# Patient Record
Sex: MALE | Race: WHITE | NOT HISPANIC OR LATINO | Employment: UNEMPLOYED | URBAN - METROPOLITAN AREA
[De-identification: names, ages, dates, MRNs, and addresses within clinical notes are randomized per-mention and may not be internally consistent; named-entity substitution may affect disease eponyms.]

---

## 2023-09-21 ENCOUNTER — HOSPITAL ENCOUNTER (EMERGENCY)
Facility: HOSPITAL | Age: 53
Discharge: PSYCHIATRIC OR SUBSTANCE USE FACILITY | End: 2023-09-21
Attending: EMERGENCY MEDICINE
Payer: MEDICAID

## 2023-09-21 ENCOUNTER — HOSPITAL ENCOUNTER (INPATIENT)
Facility: HOSPITAL | Age: 53
LOS: 5 days | Discharge: HOME | DRG: 885 | End: 2023-09-26
Attending: PSYCHIATRY & NEUROLOGY | Admitting: PSYCHIATRY & NEUROLOGY
Payer: MEDICAID

## 2023-09-21 VITALS
SYSTOLIC BLOOD PRESSURE: 153 MMHG | HEART RATE: 65 BPM | TEMPERATURE: 98 F | DIASTOLIC BLOOD PRESSURE: 100 MMHG | OXYGEN SATURATION: 98 % | RESPIRATION RATE: 18 BRPM

## 2023-09-21 DIAGNOSIS — F29 PSYCHOSIS, UNSPECIFIED PSYCHOSIS TYPE: Primary | ICD-10-CM

## 2023-09-21 DIAGNOSIS — F20.9 SCHIZOPHRENIA: ICD-10-CM

## 2023-09-21 DIAGNOSIS — R07.9 CHEST PAIN: ICD-10-CM

## 2023-09-21 LAB
ALBUMIN SERPL-MCNC: 5 G/DL (ref 3.5–5)
ALBUMIN/GLOB SERPL: 1.9 RATIO (ref 1.1–2)
ALP SERPL-CCNC: 78 UNIT/L (ref 40–150)
ALT SERPL-CCNC: 132 UNIT/L (ref 0–55)
AMPHET UR QL SCN: POSITIVE
APAP SERPL-MCNC: <17.4 UG/ML (ref 17.4–30)
APPEARANCE UR: CLEAR
AST SERPL-CCNC: 128 UNIT/L (ref 5–34)
BACTERIA #/AREA URNS AUTO: ABNORMAL /HPF
BARBITURATE SCN PRESENT UR: NEGATIVE
BASOPHILS # BLD AUTO: 0.03 X10(3)/MCL
BASOPHILS NFR BLD AUTO: 0.4 %
BENZODIAZ UR QL SCN: NEGATIVE
BILIRUB SERPL-MCNC: 1.7 MG/DL
BILIRUB UR QL STRIP.AUTO: NEGATIVE
BUN SERPL-MCNC: 29.6 MG/DL (ref 8.4–25.7)
CALCIUM SERPL-MCNC: 10.1 MG/DL (ref 8.4–10.2)
CANNABINOIDS UR QL SCN: POSITIVE
CHLORIDE SERPL-SCNC: 107 MMOL/L (ref 98–107)
CO2 SERPL-SCNC: 21 MMOL/L (ref 22–29)
COCAINE UR QL SCN: NEGATIVE
COLOR UR: ABNORMAL
CREAT SERPL-MCNC: 1.3 MG/DL (ref 0.73–1.18)
EOSINOPHIL # BLD AUTO: 0.07 X10(3)/MCL (ref 0–0.9)
EOSINOPHIL NFR BLD AUTO: 0.8 %
ERYTHROCYTE [DISTWIDTH] IN BLOOD BY AUTOMATED COUNT: 12.6 % (ref 11.5–17)
ETHANOL SERPL-MCNC: <10 MG/DL
FENTANYL UR QL SCN: NEGATIVE
FLUAV AG UPPER RESP QL IA.RAPID: NOT DETECTED
FLUBV AG UPPER RESP QL IA.RAPID: NOT DETECTED
GFR SERPLBLD CREATININE-BSD FMLA CKD-EPI: >60 MLS/MIN/1.73/M2
GLOBULIN SER-MCNC: 2.6 GM/DL (ref 2.4–3.5)
GLUCOSE SERPL-MCNC: 114 MG/DL (ref 74–100)
GLUCOSE UR QL STRIP.AUTO: NEGATIVE
HCT VFR BLD AUTO: 44.7 % (ref 42–52)
HGB BLD-MCNC: 16.1 G/DL (ref 14–18)
IMM GRANULOCYTES # BLD AUTO: 0.02 X10(3)/MCL (ref 0–0.04)
IMM GRANULOCYTES NFR BLD AUTO: 0.2 %
KETONES UR QL STRIP.AUTO: ABNORMAL
LEUKOCYTE ESTERASE UR QL STRIP.AUTO: NEGATIVE
LYMPHOCYTES # BLD AUTO: 1.5 X10(3)/MCL (ref 0.6–4.6)
LYMPHOCYTES NFR BLD AUTO: 17.6 %
MCH RBC QN AUTO: 30.7 PG (ref 27–31)
MCHC RBC AUTO-ENTMCNC: 36 G/DL (ref 33–36)
MCV RBC AUTO: 85.1 FL (ref 80–94)
MDMA UR QL SCN: NEGATIVE
MONOCYTES # BLD AUTO: 0.72 X10(3)/MCL (ref 0.1–1.3)
MONOCYTES NFR BLD AUTO: 8.5 %
NEUTROPHILS # BLD AUTO: 6.17 X10(3)/MCL (ref 2.1–9.2)
NEUTROPHILS NFR BLD AUTO: 72.5 %
NITRITE UR QL STRIP.AUTO: NEGATIVE
NRBC BLD AUTO-RTO: 0 %
OPIATES UR QL SCN: NEGATIVE
PCP UR QL: NEGATIVE
PH UR STRIP.AUTO: 5.5 [PH]
PH UR: 5.5 [PH] (ref 3–11)
PLATELET # BLD AUTO: 206 X10(3)/MCL (ref 130–400)
PMV BLD AUTO: 9.7 FL (ref 7.4–10.4)
POTASSIUM SERPL-SCNC: 3.7 MMOL/L (ref 3.5–5.1)
PROT SERPL-MCNC: 7.6 GM/DL (ref 6.4–8.3)
PROT UR QL STRIP.AUTO: ABNORMAL
RBC # BLD AUTO: 5.25 X10(6)/MCL (ref 4.7–6.1)
RBC #/AREA URNS AUTO: 14 /HPF
RBC UR QL AUTO: ABNORMAL
SALICYLATES SERPL-MCNC: <5 MG/DL (ref 15–30)
SARS-COV-2 RNA RESP QL NAA+PROBE: NOT DETECTED
SODIUM SERPL-SCNC: 140 MMOL/L (ref 136–145)
SP GR UR STRIP.AUTO: 1.03 (ref 1–1.03)
SQUAMOUS #/AREA URNS AUTO: <5 /HPF
TSH SERPL-ACNC: 0.99 UIU/ML (ref 0.35–4.94)
UROBILINOGEN UR STRIP-ACNC: 1
WBC # SPEC AUTO: 8.51 X10(3)/MCL (ref 4.5–11.5)
WBC #/AREA URNS AUTO: <5 /HPF

## 2023-09-21 PROCEDURE — 63600175 PHARM REV CODE 636 W HCPCS: Performed by: EMERGENCY MEDICINE

## 2023-09-21 PROCEDURE — 0240U COVID/FLU A&B PCR: CPT | Performed by: EMERGENCY MEDICINE

## 2023-09-21 PROCEDURE — 80307 DRUG TEST PRSMV CHEM ANLYZR: CPT | Performed by: PHYSICIAN ASSISTANT

## 2023-09-21 PROCEDURE — 85025 COMPLETE CBC W/AUTO DIFF WBC: CPT | Performed by: PHYSICIAN ASSISTANT

## 2023-09-21 PROCEDURE — 82077 ASSAY SPEC XCP UR&BREATH IA: CPT | Performed by: PHYSICIAN ASSISTANT

## 2023-09-21 PROCEDURE — 11400000 HC PSYCH PRIVATE ROOM

## 2023-09-21 PROCEDURE — 80143 DRUG ASSAY ACETAMINOPHEN: CPT | Performed by: PHYSICIAN ASSISTANT

## 2023-09-21 PROCEDURE — 81001 URINALYSIS AUTO W/SCOPE: CPT | Performed by: PHYSICIAN ASSISTANT

## 2023-09-21 PROCEDURE — 96372 THER/PROPH/DIAG INJ SC/IM: CPT | Performed by: EMERGENCY MEDICINE

## 2023-09-21 PROCEDURE — 80053 COMPREHEN METABOLIC PANEL: CPT | Performed by: PHYSICIAN ASSISTANT

## 2023-09-21 PROCEDURE — 99284 EMERGENCY DEPT VISIT MOD MDM: CPT | Mod: 25

## 2023-09-21 PROCEDURE — 80179 DRUG ASSAY SALICYLATE: CPT | Performed by: PHYSICIAN ASSISTANT

## 2023-09-21 PROCEDURE — 84443 ASSAY THYROID STIM HORMONE: CPT | Performed by: PHYSICIAN ASSISTANT

## 2023-09-21 RX ORDER — IBUPROFEN 400 MG/1
400 TABLET ORAL EVERY 6 HOURS PRN
Status: DISCONTINUED | OUTPATIENT
Start: 2023-09-21 | End: 2023-09-26 | Stop reason: HOSPADM

## 2023-09-21 RX ORDER — ACETAMINOPHEN 325 MG/1
650 TABLET ORAL EVERY 6 HOURS PRN
Status: DISCONTINUED | OUTPATIENT
Start: 2023-09-21 | End: 2023-09-26 | Stop reason: HOSPADM

## 2023-09-21 RX ORDER — HYDROXYZINE PAMOATE 25 MG/1
50 CAPSULE ORAL EVERY 6 HOURS PRN
Status: DISCONTINUED | OUTPATIENT
Start: 2023-09-21 | End: 2023-09-26 | Stop reason: HOSPADM

## 2023-09-21 RX ORDER — ZIPRASIDONE MESYLATE 20 MG/ML
20 INJECTION, POWDER, LYOPHILIZED, FOR SOLUTION INTRAMUSCULAR
Status: COMPLETED | OUTPATIENT
Start: 2023-09-21 | End: 2023-09-21

## 2023-09-21 RX ORDER — MAG HYDROX/ALUMINUM HYD/SIMETH 200-200-20
30 SUSPENSION, ORAL (FINAL DOSE FORM) ORAL EVERY 6 HOURS PRN
Status: DISCONTINUED | OUTPATIENT
Start: 2023-09-21 | End: 2023-09-26 | Stop reason: HOSPADM

## 2023-09-21 RX ORDER — KETOROLAC TROMETHAMINE 30 MG/ML
60 INJECTION, SOLUTION INTRAMUSCULAR; INTRAVENOUS
Status: COMPLETED | OUTPATIENT
Start: 2023-09-21 | End: 2023-09-21

## 2023-09-21 RX ADMIN — KETOROLAC TROMETHAMINE 60 MG: 30 INJECTION, SOLUTION INTRAMUSCULAR at 10:09

## 2023-09-21 RX ADMIN — ZIPRASIDONE MESYLATE 20 MG: 20 INJECTION, POWDER, LYOPHILIZED, FOR SOLUTION INTRAMUSCULAR at 10:09
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
## 2023-09-22 PROBLEM — F20.9 SCHIZOPHRENIA: Status: ACTIVE | Noted: 2023-09-22

## 2023-09-22 PROBLEM — Z72.0 TOBACCO ABUSE: Status: ACTIVE | Noted: 2023-09-22

## 2023-09-22 PROBLEM — F15.10 METHAMPHETAMINE ABUSE: Status: ACTIVE | Noted: 2023-09-22

## 2023-09-22 PROBLEM — F12.10 CANNABIS ABUSE: Status: ACTIVE | Noted: 2023-09-22

## 2023-09-22 LAB
CHOLEST SERPL-MCNC: 137 MG/DL
CHOLEST/HDLC SERPL: 4 {RATIO} (ref 0–5)
GLUCOSE P FAST SERPL-MCNC: 121 MG/DL (ref 70–155)
HDLC SERPL-MCNC: 33 MG/DL (ref 35–60)
LDLC SERPL CALC-MCNC: 81 MG/DL (ref 50–140)
T PALLIDUM AB SER QL: NONREACTIVE
TRIGL SERPL-MCNC: 115 MG/DL (ref 34–140)
VLDLC SERPL CALC-MCNC: 23 MG/DL

## 2023-09-22 PROCEDURE — 25000003 PHARM REV CODE 250: Performed by: PSYCHIATRY & NEUROLOGY

## 2023-09-22 PROCEDURE — 82947 ASSAY GLUCOSE BLOOD QUANT: CPT | Performed by: PSYCHIATRY & NEUROLOGY

## 2023-09-22 PROCEDURE — 86780 TREPONEMA PALLIDUM: CPT | Performed by: PSYCHIATRY & NEUROLOGY

## 2023-09-22 PROCEDURE — 80061 LIPID PANEL: CPT | Performed by: PSYCHIATRY & NEUROLOGY

## 2023-09-22 PROCEDURE — 11400000 HC PSYCH PRIVATE ROOM

## 2023-09-22 RX ORDER — AMLODIPINE BESYLATE 10 MG/1
10 TABLET ORAL DAILY
COMMUNITY
Start: 2023-08-19

## 2023-09-22 RX ORDER — RISPERIDONE 1 MG/1
1 TABLET ORAL 2 TIMES DAILY
Status: DISCONTINUED | OUTPATIENT
Start: 2023-09-22 | End: 2023-09-26 | Stop reason: HOSPADM

## 2023-09-22 RX ORDER — ATORVASTATIN CALCIUM 40 MG/1
40 TABLET, FILM COATED ORAL NIGHTLY
COMMUNITY
Start: 2023-08-30

## 2023-09-22 RX ORDER — PANTOPRAZOLE SODIUM 40 MG/1
40 TABLET, DELAYED RELEASE ORAL DAILY
Status: DISCONTINUED | OUTPATIENT
Start: 2023-09-22 | End: 2023-09-26 | Stop reason: HOSPADM

## 2023-09-22 RX ORDER — AMLODIPINE BESYLATE 5 MG/1
10 TABLET ORAL DAILY
Status: DISCONTINUED | OUTPATIENT
Start: 2023-09-22 | End: 2023-09-26 | Stop reason: HOSPADM

## 2023-09-22 RX ORDER — PANTOPRAZOLE SODIUM 40 MG/1
40 TABLET, DELAYED RELEASE ORAL DAILY
COMMUNITY

## 2023-09-22 RX ORDER — ATORVASTATIN CALCIUM 40 MG/1
40 TABLET, FILM COATED ORAL NIGHTLY
Status: DISCONTINUED | OUTPATIENT
Start: 2023-09-22 | End: 2023-09-26 | Stop reason: HOSPADM

## 2023-09-22 RX ORDER — DIVALPROEX SODIUM 250 MG/1
250 TABLET, DELAYED RELEASE ORAL 2 TIMES DAILY
Status: ON HOLD | COMMUNITY
Start: 2023-08-18 | End: 2023-09-22 | Stop reason: CLARIF

## 2023-09-22 RX ORDER — INDOMETHACIN 50 MG/1
50 CAPSULE ORAL 3 TIMES DAILY
Status: ON HOLD | COMMUNITY
Start: 2023-08-30 | End: 2023-09-22 | Stop reason: CLARIF

## 2023-09-22 RX ORDER — ESCITALOPRAM OXALATE 10 MG/1
10 TABLET ORAL DAILY
Status: ON HOLD | COMMUNITY
Start: 2023-08-19 | End: 2023-09-22 | Stop reason: CLARIF

## 2023-09-22 RX ADMIN — AMLODIPINE BESYLATE 10 MG: 5 TABLET ORAL at 04:09

## 2023-09-22 RX ADMIN — HYDROXYZINE PAMOATE 50 MG: 25 CAPSULE ORAL at 10:09

## 2023-09-22 RX ADMIN — RISPERIDONE 1 MG: 1 TABLET ORAL at 08:09

## 2023-09-22 RX ADMIN — RISPERIDONE 1 MG: 1 TABLET ORAL at 11:09

## 2023-09-22 RX ADMIN — ATORVASTATIN CALCIUM 40 MG: 40 TABLET, FILM COATED ORAL at 08:09

## 2023-09-22 RX ADMIN — HYDROXYZINE PAMOATE 50 MG: 25 CAPSULE ORAL at 03:09

## 2023-09-22 RX ADMIN — PANTOPRAZOLE SODIUM 40 MG: 40 TABLET, DELAYED RELEASE ORAL at 04:09

## 2023-09-22 RX ADMIN — HYDROXYZINE PAMOATE 50 MG: 25 CAPSULE ORAL at 08:09
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
## 2023-09-22 NOTE — PROGRESS NOTES
Recreation Therapy Progress Note    Date: 9 22 2023     Time: 10:00 am group     Group Title: creative expression     Mood: depressed and need to sleep he states.     Behavior: cooperative but requested to sleep . Pt excused to sleep . Pt is a new admit.     Affect: pt requested to sleep . Pt not feeling well.     Speech: normal    Cognition: alert when talk to.     Participation Level: pt requested to sleep     Intervention:cont rt services          Recreation Therapist   Signature: Verónica Gracia MA CTRS

## 2023-09-22 NOTE — HPI
54 yo male admitted to [facility] for bizarre behavior and suicidal ideation.  He also has h/o substance abuse.  Currently he is resting in the bed.  He denies any N/V/D/C.  No fever/chills.  No chest pain/SOB.  Hospitalist have been consulted for medical evaluation.

## 2023-09-22 NOTE — PLAN OF CARE
Problem: Behavior Regulation Impairment (Psychotic Signs/Symptoms)  Goal: Improved Behavioral Control (Psychotic Signs/Symptoms)  Outcome: Ongoing, Progressing     Problem: Cognitive Impairment (Psychotic Signs/Symptoms)  Goal: Optimal Cognitive Function (Psychotic Signs/Symptoms)  Outcome: Ongoing, Progressing     Problem: Psychomotor Impairment (Psychotic Signs/Symptoms)  Goal: Improved Psychomotor Symptoms (Psychotic Signs/Symptoms)  Outcome: Ongoing, Progressing     Problem: Behavior Regulation Impairment (Excessive Substance Use)  Goal: Improved Behavioral Control (Excessive Substance Use)  Outcome: Ongoing, Progressing     Problem: Behavior Regulation Impairment (Psychotic Signs/Symptoms)  Goal: Improved Behavioral Control (Psychotic Signs/Symptoms)  Outcome: Ongoing, Progressing     Problem: Cognitive Impairment (Psychotic Signs/Symptoms)  Goal: Optimal Cognitive Function (Psychotic Signs/Symptoms)  Outcome: Ongoing, Progressing     Problem: Psychomotor Impairment (Psychotic Signs/Symptoms)  Goal: Improved Psychomotor Symptoms (Psychotic Signs/Symptoms)  Outcome: Ongoing, Progressing     Problem: Behavior Regulation Impairment (Excessive Substance Use)  Goal: Improved Behavioral Control (Excessive Substance Use)  Outcome: Ongoing, Progressing

## 2023-09-22 NOTE — PROGRESS NOTES
Recreation Therapy Progress Note    Date: 9 22 2023     Time: 1400 hour group     Group Title: leisure skills     Mood: pt sleeping . Pt requested to sleep     Behavior: pt in bed    Affect: pt in bed     Speech: normal when talk to.     Cognition: pt in bed     Participation Level: 0% pt stated he needs sleep    Intervention:cont to offer rt services.         Recreation Therapist   Signature: Verónica soriano MA CTRS

## 2023-09-22 NOTE — NURSING
Admission Note:    Glenn Ruiz is a 53 y.o. male, : 1970, MRN: [id number], admitted on 2023 to [facility] ([facility]) for Eloy Pulido MD with a diagnosis of Schizophrenia. Patient admitted on a status of  Emergency Certificate (CEC). Glenn reports no known food or drug allergies.    Patient demonstrated an affect that was flat and anxious. Patient demonstrated mood during assessment that was depressed. Patient had an appearance that was disheveled.  Patient denies suicidal ideation. Patient denies suicide plan. Patient denies hallucinations.    Glenn's  height is 6' (1.829 m) and weight is 100.3 kg (221 lb 1.9 oz). His temperature is 97.7 °F (36.5 °C). His blood pressure is 132/81 and his pulse is 61. His respiration is 20 and oxygen saturation is 96%.     Citlallis last BM was noted on: _23______    Metal detector screening performed via security personnel. The result of the scan was _negative______. Head-to-toe physical assessment completed with the following findings:  _Nothing_______ found upon body screen. A full skin assessment was performed. Citlallis skin appeared WNL_______.  Glenn was oriented to unit, staff, peers, and room. Patient belongings/valuables stored in locked intake room cabinet and changes of clothing provided to patient. Glenn was placed on Q 15 min observations.

## 2023-09-22 NOTE — NURSING
PRN Medication Follow-up Note:    Behavior:    Patient (Glenn Ruiz is a 53 y.o. male, : 1970, MRN: [id number])     Allergies: Patient has no allergy information on record.    Glenn's  height is 6' (1.829 m) and weight is 100.3 kg (221 lb 1.9 oz). His temperature is 97.7 °F (36.5 °C). His blood pressure is 132/81 and his pulse is 61. His respiration is 20 and oxygen saturation is 96%.     Administered _Vistaril 50 mg PO___ per physician order to Glenn        Response:    Glenn's response: _patient quietly resting and lying in bed, no further complaints at this time____      Plan:     Continue to monitor per MD/PA/APRN orders; and reevaluate medication effectiveness within 30 minutes.

## 2023-09-22 NOTE — HOSPITAL COURSE
9/22/23-Resting in the bed.  No issues at this time. He does seem to be disheveled in appearance and his thought process.

## 2023-09-22 NOTE — ASSESSMENT & PLAN NOTE
- Obtain collateral from mother or other family for further history - pt gave verbal consent to contact during this assessment  - Obtain records from previous tx facility if identified  - Start Risperdal 1mg PO BID for psychosis - consider use of MATHEW prior to discharge d/t h/o med nonadherence

## 2023-09-22 NOTE — NURSING
Pt pacing tariq anxiously.  Requesting something to help him.  Vistaril 50mg po given for anxiety.
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
## 2023-09-22 NOTE — HOSPITAL COURSE
"The patient was admitted to the psychiatric unit and monitored for safety. The medications were reconciled. A history and physical was completed by the primary care doctor and medical issues were addressed. The patient was provided with individual and group therapy. On admit, pt was started on Risperdal for psychosis.    9/25/23-He reports today that he had "got caught up with meth; the issue comes and goes". He claims that he has only been using once or twice a week but that he needed to get a handle on it before he could not longer control his usage. "I used to use pain pills but I have been sober from that for years; I know how easy it is to get caught up". He claims that he has stopped using meth a few times before so he doesn't feel that it is a true issue for him. His family has offered to fly him to treatment and pay for everything but he is not interested. He stated that he has been "negligent on my medications and I have made a commitment to my mother to comply with medications so I am in agreeance with continuing them. Since starting the Risperdal he reports that the hallucinations have lessened. He is planning on returning to his home in [place] and stated that "if he happens to relapse" he will consider an outpatient program at that time.    9/26/23-Seen for treatment team.Admitted for meth use and psychosis. Compliant with meds here and no side effects. He has a history of impulsive acts when on drugs. He refuses to go to a rehab program and reviewed that he is high risk for relapse. He says he will go to AA/NA. No active hallucinations or delusions. No si/hi. Mood has improved and he has been less irritable and labile. He is still demanding at times. No violence. No withdrawals. Sleeping and eating well. Not acute danger to self or others. Stable to discharge home today and says he can return home with mother. Mother will pick him up.  "

## 2023-09-22 NOTE — NURSING
"Glenn met with Dr. Ansari today via telemed for ps evaluation. He presented with loud, pressured speech. States, "I don't specifically know why I'm here". "I did a little meth". Reports that he's "exhausted" and hasn't been sleeping much. Uses meth 1-2 times a week, denies alcohol use. He was recently in a [facility] hospital but can't recall the name, says it was in [place]. Admits to hx of Schizophrenia and Bipolar hearing voices in the past, "voices from God, possibly from demons". Denies paranoia. States that he does not take prescribed medications, "I don't believe in them". He does agree to take meds while his hospitalized. Q 15 min safety checks. Will monitor mood and behavior and offer emotional support.  "

## 2023-09-22 NOTE — SUBJECTIVE & OBJECTIVE
Patient History               Medical as of 9/22/2023    Past Medical History: Patient provided no pertinent medical history.               Surgical as of 9/22/2023    Past Surgical History: Patient provided no pertinent surgical history.               Family as of 9/22/2023    None               Tobacco Use as of 9/22/2023       Smoking Status Smoking Start Date Quit Date Current Packs/Day Average Packs/Day    Never Assessed -- -- --       Smokeless Status Smokeless Type Smokeless Quit Date    Unknown -- --      Source    --                  Alcohol Use as of 9/22/2023       Alcohol Use Drinks/Week Alcohol/Week Comments Source    Not Currently   -- -- Provider                  Drug Use as of 9/22/2023       Drug Use Types Frequency Comments Source    Yes  Marijuana, Methamphetamines -- -- Provider                  Sexual Activity as of 9/22/2023    None               Activities of Daily Living as of 9/22/2023    None               Social Documentation as of 9/22/2023    None               Occupational as of 9/22/2023    None               Socioeconomic as of 9/22/2023       Marital Status Spouse Name Number of Children Years Education Education Level Preferred Language Ethnicity Race Source    Single -- -- -- -- English Not  or /a White --                  Pertinent History       Question Response Comments    Lives with -- --    Place in Birth Order -- --    Lives in -- --    Number of Siblings -- --    Raised by -- --    Legal Involvement -- --    Childhood Trauma -- --    Criminal History of -- --    Financial Status -- --    Highest Level of Education -- --    Does patient have access to a firearm? -- --     Service -- --    Primary Leisure Activity -- --    Spirituality -- --          No past medical history on file.  No past surgical history on file.  Family History    None       Tobacco Use    Smoking status: Not on file    Smokeless tobacco: Not on file   Substance and Sexual  Activity    Alcohol use: Not Currently    Drug use: Yes     Types: Marijuana, Methamphetamines    Sexual activity: Not on file     Review of patient's allergies indicates:  No Known Allergies    Current Facility-Administered Medications on File Prior to Encounter   Medication    [COMPLETED] ziprasidone injection 20 mg     Current Outpatient Medications on File Prior to Encounter   Medication Sig    amLODIPine (NORVASC) 10 MG tablet Take 10 mg by mouth Daily.    atorvastatin (LIPITOR) 40 MG tablet Take 40 mg by mouth nightly.    pantoprazole (PROTONIX) 40 MG tablet Take 40 mg by mouth Daily.    [DISCONTINUED] divalproex (DEPAKOTE) 250 MG EC tablet Take 250 mg by mouth 2 (two) times daily.    [DISCONTINUED] EScitalopram oxalate (LEXAPRO) 10 MG tablet Take 10 mg by mouth Daily.    [DISCONTINUED] indomethacin (INDOCIN) 50 MG capsule Take 50 mg by mouth 3 (three) times daily.     Psychotherapeutics (From admission, onward)      None          Review of Systems   Constitutional:  Negative for chills and fever.   HENT:  Negative for congestion and sore throat.    Eyes:  Negative for visual disturbance.   Respiratory:  Negative for cough and shortness of breath.    Cardiovascular:  Negative for chest pain and palpitations.   Gastrointestinal:  Negative for diarrhea, nausea and vomiting.   Endocrine: Negative for cold intolerance and polyuria.   Genitourinary:  Negative for dysuria and flank pain.   Musculoskeletal:  Negative for back pain and joint swelling.   Skin:  Negative for rash.   Allergic/Immunologic: Negative for immunocompromised state.   Neurological:  Negative for seizures and headaches.   Psychiatric/Behavioral:  Positive for hallucinations. The patient is nervous/anxious.      Strengths and Liabilities: Strength: Patient is expressive/articulate., Strength: Patient is intelligent., Liability: Patient has poor judgment, Liability: Patient is unstable.    Objective:     Vital Signs (Most Recent):  Temp: 98.1 °F  (36.7 °C) (09/22/23 0612)  Pulse: 65 (09/22/23 0612)  Resp: 20 (09/22/23 0612)  BP: 126/81 (09/22/23 0612)  SpO2: 97 % (09/22/23 0612) Vital Signs (24h Range):  Temp:  [97.7 °F (36.5 °C)-98.1 °F (36.7 °C)] 98.1 °F (36.7 °C)  Pulse:  [61-66] 65  Resp:  [18-20] 20  SpO2:  [96 %-100 %] 97 %  BP: (126-153)/() 126/81     Height: 6' (182.9 cm)  Weight: 100.3 kg (221 lb 1.9 oz)  Body mass index is 29.99 kg/m².    No intake or output data in the 24 hours ending 09/22/23 1020       Physical Exam  Musculosekeletal Exam:  Muscle Stength/Tone: appears wnl  Gait: normal/steady  Involuntary Movements: no overt tremor or involuntary movements observed    Mental Status Exam:  Appearance: disheveled and dressed in hospital scrubs  Sensorium: alert  Orientation: oriented to person, place, and time  Behavior/Cooperation: reticent and suspicious  Psychomotor activity: No tremor or involuntary movements observed. No psychomotor retardation or agitation  Speech:  minimal, brief responses  Language: fluent English, naming and word repetition intact   Affect: guarded, anxious  Mood: anxious  Thought Process:  difficult to assess, possibly some disorganization per observations by staff  Associations: no loosening of associations  Thought Content: denies SI/HI/plan/intent and +paranoia evident  Thought Perceptions: +AH  Memory: Unable to assess  Attention/Concentration: Impaired to some degree  Fund of Knowledge: history, vocabulary, and fund of knowledge suggest intellectual functioning in the normal range  Insight: poor  Judgment: poor        Significant Labs:   Recent Results (from the past 48 hour(s))   Urinalysis, Reflex to Urine Culture    Collection Time: 09/21/23  9:57 AM    Specimen: Urine   Result Value Ref Range    Color, UA Dark Yellow Yellow, Light-Yellow, Dark Yellow, Amy, Straw    Appearance, UA Clear Clear    Specific Gravity, UA 1.028 1.005 - 1.030    pH, UA 5.5 5.0 - 8.5    Protein, UA 1+ (A) Negative    Glucose, UA  Negative Negative, Normal    Ketones, UA Trace (A) Negative    Blood, UA 1+ (A) Negative    Bilirubin, UA Negative Negative    Urobilinogen, UA 1.0 0.2, 1.0, Normal    Nitrites, UA Negative Negative    Leukocyte Esterase, UA Negative Negative   Drug Screen, Urine    Collection Time: 09/21/23  9:57 AM   Result Value Ref Range    Amphetamines, Urine Positive (A) Negative    Barbituates, Urine Negative Negative    Benzodiazepine, Urine Negative Negative    Cannabinoids, Urine Positive (A) Negative    Cocaine, Urine Negative Negative    Fentanyl, Urine Negative Negative    MDMA, Urine Negative Negative    Opiates, Urine Negative Negative    Phencyclidine, Urine Negative Negative    pH, Urine 5.5 3.0 - 11.0   Urinalysis, Microscopic    Collection Time: 09/21/23  9:57 AM   Result Value Ref Range    RBC, UA 14 (H) <=5 /HPF    WBC, UA <5 <=5 /HPF    Squamous Epithelial Cells, UA <5 <=5 /HPF    Bacteria, UA None Seen None Seen, Rare, Occasional /HPF   Comprehensive metabolic panel    Collection Time: 09/21/23 10:03 AM   Result Value Ref Range    Sodium Level 140 136 - 145 mmol/L    Potassium Level 3.7 3.5 - 5.1 mmol/L    Chloride 107 98 - 107 mmol/L    Carbon Dioxide 21 (L) 22 - 29 mmol/L    Glucose Level 114 (H) 74 - 100 mg/dL    Blood Urea Nitrogen 29.6 (H) 8.4 - 25.7 mg/dL    Creatinine 1.30 (H) 0.73 - 1.18 mg/dL    Calcium Level Total 10.1 8.4 - 10.2 mg/dL    Protein Total 7.6 6.4 - 8.3 gm/dL    Albumin Level 5.0 3.5 - 5.0 g/dL    Globulin 2.6 2.4 - 3.5 gm/dL    Albumin/Globulin Ratio 1.9 1.1 - 2.0 ratio    Bilirubin Total 1.7 (H) <=1.5 mg/dL    Alkaline Phosphatase 78 40 - 150 unit/L    Alanine Aminotransferase 132 (H) 0 - 55 unit/L    Aspartate Aminotransferase 128 (H) 5 - 34 unit/L    eGFR >60 mls/min/1.73/m2   TSH    Collection Time: 09/21/23 10:03 AM   Result Value Ref Range    Thyroid Stimulating Hormone 0.986 0.350 - 4.940 uIU/mL   Ethanol    Collection Time: 09/21/23 10:03 AM   Result Value Ref Range    Ethanol  "Level <10.0 <=10.0 mg/dL   Acetaminophen level    Collection Time: 09/21/23 10:03 AM   Result Value Ref Range    Acetaminophen Level <17.4 (L) 17.4 - 30.0 ug/ml   Salicylate level    Collection Time: 09/21/23 10:03 AM   Result Value Ref Range    Salicylate Level <5.0 (L) 15.0 - 30.0 mg/dL   CBC with Differential    Collection Time: 09/21/23 10:03 AM   Result Value Ref Range    WBC 8.51 4.50 - 11.50 x10(3)/mcL    RBC 5.25 4.70 - 6.10 x10(6)/mcL    Hgb 16.1 14.0 - 18.0 g/dL    Hct 44.7 42.0 - 52.0 %    MCV 85.1 80.0 - 94.0 fL    MCH 30.7 27.0 - 31.0 pg    MCHC 36.0 33.0 - 36.0 g/dL    RDW 12.6 11.5 - 17.0 %    Platelet 206 130 - 400 x10(3)/mcL    MPV 9.7 7.4 - 10.4 fL    Neut % 72.5 %    Lymph % 17.6 %    Mono % 8.5 %    Eos % 0.8 %    Basophil % 0.4 %    Lymph # 1.50 0.6 - 4.6 x10(3)/mcL    Neut # 6.17 2.1 - 9.2 x10(3)/mcL    Mono # 0.72 0.1 - 1.3 x10(3)/mcL    Eos # 0.07 0 - 0.9 x10(3)/mcL    Baso # 0.03 <=0.2 x10(3)/mcL    IG# 0.02 0 - 0.04 x10(3)/mcL    IG% 0.2 %    NRBC% 0.0 %   COVID/FLU A&B PCR    Collection Time: 09/21/23 12:59 PM   Result Value Ref Range    Influenza A PCR Not Detected Not Detected    Influenza B PCR Not Detected Not Detected    SARS-CoV-2 PCR Not Detected Not Detected, Negative, Invalid   Lipid panel    Collection Time: 09/22/23  4:23 AM   Result Value Ref Range    Cholesterol Total 137 <=200 mg/dL    HDL Cholesterol 33 (L) 35 - 60 mg/dL    Triglyceride 115 34 - 140 mg/dL    Cholesterol/HDL Ratio 4 0 - 5    Very Low Density Lipoprotein 23     LDL Cholesterol 81.00 50.00 - 140.00 mg/dL   Glucose, Fasting    Collection Time: 09/22/23  4:23 AM   Result Value Ref Range    Glucose Fasting 121 70 - 155 mg/dL      No results found for: "PHENYTOIN", "PHENOBARB", "VALPROATE", "CBMZ"    Significant Imaging: None  "

## 2023-09-22 NOTE — PROGRESS NOTES
Recreation Therapy Assessment    1. MOOD: depressed and low energy     2. BEHAVIOR:cooperative     3. AFFECT:flat and depressed     4. COGNITION: alert when talk to     5. MOTOR BEHAVIOR/SKILLS: ambulatory     6. SPEECH:normal     7. LEISURE FUNCTIONING: declined to to depression and drug use.     8. LEISURE NEEDS: to regain positive quality of life.     9. PT EDUCATION LEVEL: 12 grade     10. WHAT IS THE BEST THING THAT EVER HAPPENED TO YOU? My sons    11. THINGS THAT FRUSTRATE AND ANGER ME ARE: the drug use     12. WHEN I GET ANGRY, I USUALLY: the drug use and being depressed and no energy     13. MY RELATIONSHIP WITH MOST PEOPLE ARE: distant     14. LEISURE INTERESTS/SKILLS: nothing lately . Pt use to enjoy sports,games ,fishing, social events.    15. LEISURE BARRIERS: depression and drug use pt states.     16. ASSESSMENT SUMMARY: pt is a 53 year old white male. Pt lives in [place] [place] with his mother he states. Pt has 2 sons only keeping in touch with the youngest son lately. Pt has no job. 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 Pt states he was using meth and THC  drugs. Pt states he needs to sleep.       17. TX PLAN FOR RECREATION THERAPY: pt will receive rt services 2 x a day and 5 x a week with fatemeh soriano MA CTRS . Pt will be assisted to complete 3 to 4 assignments on problem solving skills and emotional outlets to enhance coping skills and self worth . Pt will be assisted to complete 3 to 4 assignments on  leisure skills ,interests and participation to enhance quality of life,social skills and self expression and decrease substance abuse.       18. SIGNATURE/CREDENTIALS: Fatemeh Soriano MA CTRS                                                                     DATE: 9 22 2023                              TIME:8;35 am         RECREATION THERAPIST  NERY

## 2023-09-22 NOTE — CONSULTS
[facility] Hospital Medicine  Consult Note    Patient Name: Glenn Ruiz  MRN: [id number]  Admission Date: 9/21/2023  Hospital Length of Stay: 1 days  Attending Physician: Eloy Pulido MD   Primary Care Provider: Mony Primary Doctor           Patient information was obtained from patient and ER records.     Consults  Subjective:     Principal Problem: Schizophrenia    Chief Complaint:  hallucinations.       HPI: 52 yo male admitted to [facility] for bizarre behavior and suicidal ideation.  He also has h/o substance abuse.  Currently he is resting in the bed.  He denies any N/V/D/C.  No fever/chills.  No chest pain/SOB.  Hospitalist have been consulted for medical evaluation.      Past medical history on file:  Schizophrenia, substance abuse, HTN    Past surgical history on file:  Penile surgery    Review of patient's allergies indicates:  No Known Allergies    No current facility-administered medications on file prior to encounter.     Current Outpatient Medications on File Prior to Encounter   Medication Sig    amLODIPine (NORVASC) 10 MG tablet Take 10 mg by mouth Daily.    atorvastatin (LIPITOR) 40 MG tablet Take 40 mg by mouth nightly.    pantoprazole (PROTONIX) 40 MG tablet Take 40 mg by mouth Daily.    [DISCONTINUED] divalproex (DEPAKOTE) 250 MG EC tablet Take 250 mg by mouth 2 (two) times daily.    [DISCONTINUED] EScitalopram oxalate (LEXAPRO) 10 MG tablet Take 10 mg by mouth Daily.    [DISCONTINUED] indomethacin (INDOCIN) 50 MG capsule Take 50 mg by mouth 3 (three) times daily.     Family History    None       Tobacco Use    Smoking status: Not on file    Smokeless tobacco: Not on file   Substance and Sexual Activity    Alcohol use: Not Currently    Drug use: Yes     Types: Marijuana, Methamphetamines    Sexual activity: Not on file     Review of Systems   Constitutional: Negative.    HENT: Negative.     Eyes: Negative.    Respiratory: Negative.     Cardiovascular: Negative.     Gastrointestinal: Negative.    Endocrine: Negative.    Genitourinary: Negative.    Musculoskeletal: Negative.    Skin: Negative.    Allergic/Immunologic: Negative.    Neurological: Negative.    Hematological: Negative.    Psychiatric/Behavioral:  Positive for behavioral problems, confusion and suicidal ideas.      Objective:     Vital Signs (Most Recent):  Temp: 98.1 °F (36.7 °C) (09/22/23 0612)  Pulse: 65 (09/22/23 0612)  Resp: 20 (09/22/23 0612)  BP: 126/81 (09/22/23 0612)  SpO2: 97 % (09/22/23 0612) Vital Signs (24h Range):  Temp:  [97.7 °F (36.5 °C)-98.1 °F (36.7 °C)] 98.1 °F (36.7 °C)  Pulse:  [61-66] 65  Resp:  [18-20] 20  SpO2:  [96 %-100 %] 97 %  BP: (126-153)/() 126/81     Weight: 100.3 kg (221 lb 1.9 oz)  Body mass index is 29.99 kg/m².     Physical Exam  Constitutional:       Appearance: Normal appearance. He is obese.   HENT:      Head: Normocephalic and atraumatic.      Nose: Nose normal.      Mouth/Throat:      Mouth: Mucous membranes are moist.      Pharynx: Oropharynx is clear.   Eyes:      Extraocular Movements: Extraocular movements intact and EOM normal.      Conjunctiva/sclera: Conjunctivae normal.      Pupils: Pupils are equal, round, and reactive to light.   Cardiovascular:      Rate and Rhythm: Normal rate and regular rhythm.      Pulses: Normal pulses.      Heart sounds: Normal heart sounds.   Pulmonary:      Effort: Pulmonary effort is normal.      Breath sounds: Normal breath sounds.   Abdominal:      General: Bowel sounds are normal.      Palpations: Abdomen is soft.   Musculoskeletal:         General: Normal range of motion.      Cervical back: Normal range of motion and neck supple.   Skin:     General: Skin is warm and dry.      Capillary Refill: Capillary refill takes 2 to 3 seconds.   Neurological:      General: No focal deficit present.      Mental Status: He is alert. Mental status is at baseline. He is disoriented.   Psychiatric:         Attention and Perception: He is  "inattentive. He perceives auditory hallucinations.         Mood and Affect: Affect is inappropriate.         Speech: Speech normal.         Behavior: Behavior is slowed.         Thought Content: Thought content is delusional. Thought content includes suicidal ideation.         Cognition and Memory: Cognition is impaired. Memory is impaired.         Judgment: Judgment is inappropriate.         CRANIAL NERVES     CN I  cranial nerve I not tested    CN II   Visual fields full to confrontation.     CN III, IV, VI   Pupils are equal, round, and reactive to light.  Extraocular motions are normal.   CN III: no CN III palsy  CN VI: no CN VI palsy    CN V   Facial sensation intact.     CN VII   Facial expression full, symmetric.     CN VIII   CN VIII normal.     CN IX, X   CN IX normal.   CN X normal.     CN XI   CN XI normal.     CN XII   CN XII normal.         Significant Labs: All pertinent labs within the past 24 hours have been reviewed.  BMP:   Recent Labs   Lab 09/21/23  1003      K 3.7   CO2 21*   BUN 29.6*   CREATININE 1.30*   CALCIUM 10.1     CBC:   Recent Labs   Lab 09/21/23  1003   WBC 8.51   HGB 16.1   HCT 44.7        CMP:   Recent Labs   Lab 09/21/23  1003      K 3.7   CO2 21*   BUN 29.6*   CREATININE 1.30*   CALCIUM 10.1   ALBUMIN 5.0   BILITOT 1.7*   ALKPHOS 78   *   *     Magnesium: No results for input(s): "MG" in the last 48 hours.    Significant Imaging: I have reviewed all pertinent imaging results/findings within the past 24 hours.    Assessment/Plan:     * Schizophrenia  Admit to U  Review meds  OOB  ambulate      Tobacco abuse  Advised to stop.  Can offer nicotine patch.  He is not interested in stopping(cessation=4mins)      Methamphetamine abuse  Advised to stop        VTE Risk Mitigation (From admission, onward)      None                Thank you for your consult. I will sign off. Please contact us if you have any additional questions.    Iftikhar Cortes, " MD  Department of Hospital Medicine   [facility]

## 2023-09-22 NOTE — SUBJECTIVE & OBJECTIVE
No past medical history on file.    No past surgical history on file.    Review of patient's allergies indicates:  No Known Allergies    No current facility-administered medications on file prior to encounter.     Current Outpatient Medications on File Prior to Encounter   Medication Sig    amLODIPine (NORVASC) 10 MG tablet Take 10 mg by mouth Daily.    atorvastatin (LIPITOR) 40 MG tablet Take 40 mg by mouth nightly.    pantoprazole (PROTONIX) 40 MG tablet Take 40 mg by mouth Daily.    [DISCONTINUED] divalproex (DEPAKOTE) 250 MG EC tablet Take 250 mg by mouth 2 (two) times daily.    [DISCONTINUED] EScitalopram oxalate (LEXAPRO) 10 MG tablet Take 10 mg by mouth Daily.    [DISCONTINUED] indomethacin (INDOCIN) 50 MG capsule Take 50 mg by mouth 3 (three) times daily.     Family History    None       Tobacco Use    Smoking status: Not on file    Smokeless tobacco: Not on file   Substance and Sexual Activity    Alcohol use: Not Currently    Drug use: Yes     Types: Marijuana, Methamphetamines    Sexual activity: Not on file     Review of Systems   Constitutional: Negative.    HENT: Negative.     Eyes: Negative.    Respiratory: Negative.     Cardiovascular: Negative.    Gastrointestinal: Negative.    Endocrine: Negative.    Genitourinary: Negative.    Musculoskeletal: Negative.    Skin: Negative.    Allergic/Immunologic: Negative.    Neurological: Negative.    Hematological: Negative.    Psychiatric/Behavioral:  Positive for behavioral problems, confusion and suicidal ideas.      Objective:     Vital Signs (Most Recent):  Temp: 98.1 °F (36.7 °C) (09/22/23 0612)  Pulse: 65 (09/22/23 0612)  Resp: 20 (09/22/23 0612)  BP: 126/81 (09/22/23 0612)  SpO2: 97 % (09/22/23 0612) Vital Signs (24h Range):  Temp:  [97.7 °F (36.5 °C)-98.1 °F (36.7 °C)] 98.1 °F (36.7 °C)  Pulse:  [61-66] 65  Resp:  [18-20] 20  SpO2:  [96 %-100 %] 97 %  BP: (126-153)/() 126/81     Weight: 100.3 kg (221 lb 1.9 oz)  Body mass index is 29.99 kg/m².      Physical Exam  Constitutional:       Appearance: Normal appearance. He is obese.   HENT:      Head: Normocephalic and atraumatic.      Nose: Nose normal.      Mouth/Throat:      Mouth: Mucous membranes are moist.      Pharynx: Oropharynx is clear.   Eyes:      Extraocular Movements: Extraocular movements intact and EOM normal.      Conjunctiva/sclera: Conjunctivae normal.      Pupils: Pupils are equal, round, and reactive to light.   Cardiovascular:      Rate and Rhythm: Normal rate and regular rhythm.      Pulses: Normal pulses.      Heart sounds: Normal heart sounds.   Pulmonary:      Effort: Pulmonary effort is normal.      Breath sounds: Normal breath sounds.   Abdominal:      General: Bowel sounds are normal.      Palpations: Abdomen is soft.   Musculoskeletal:         General: Normal range of motion.      Cervical back: Normal range of motion and neck supple.   Skin:     General: Skin is warm and dry.      Capillary Refill: Capillary refill takes 2 to 3 seconds.   Neurological:      General: No focal deficit present.      Mental Status: He is alert. Mental status is at baseline. He is disoriented.   Psychiatric:         Attention and Perception: He is inattentive. He perceives auditory hallucinations.         Mood and Affect: Affect is inappropriate.         Speech: Speech normal.         Behavior: Behavior is slowed.         Thought Content: Thought content is delusional. Thought content includes suicidal ideation.         Cognition and Memory: Cognition is impaired. Memory is impaired.         Judgment: Judgment is inappropriate.         CRANIAL NERVES     CN I  cranial nerve I not tested    CN II   Visual fields full to confrontation.     CN III, IV, VI   Pupils are equal, round, and reactive to light.  Extraocular motions are normal.   CN III: no CN III palsy  CN VI: no CN VI palsy    CN V   Facial sensation intact.     CN VII   Facial expression full, symmetric.     CN VIII   CN VIII normal.     CN IX,  "X   CN IX normal.   CN X normal.     CN XI   CN XI normal.     CN XII   CN XII normal.         Significant Labs: All pertinent labs within the past 24 hours have been reviewed.  BMP:   Recent Labs   Lab 09/21/23  1003      K 3.7   CO2 21*   BUN 29.6*   CREATININE 1.30*   CALCIUM 10.1     CBC:   Recent Labs   Lab 09/21/23  1003   WBC 8.51   HGB 16.1   HCT 44.7        CMP:   Recent Labs   Lab 09/21/23  1003      K 3.7   CO2 21*   BUN 29.6*   CREATININE 1.30*   CALCIUM 10.1   ALBUMIN 5.0   BILITOT 1.7*   ALKPHOS 78   *   *     Magnesium: No results for input(s): "MG" in the last 48 hours.    Significant Imaging: I have reviewed all pertinent imaging results/findings within the past 24 hours.  "

## 2023-09-22 NOTE — GROUP NOTE
Group Psychotherapy       Group Focus: Psychodynamic Group Psychotherapy      Number of patients in attendance: 1    Group Start Time: 0900  Group End Time:  0930  Groups Date: 9/22/2023  Group Topic:  Behavioral Health  Group Department: [facility]  Group Facilitators:  Bronson Lockett LCSW  _____________________________________________________________________    Patient Name: Glenn Ruiz  MRN: [id number]  Patient Class: IP- Psych   Admission Date\Time: 9/21/2023  8:23 PM  Hospital Length of Stay: 1  Primary Care Provider: Mony, Primary Doctor     Referred by: Behavioral Medicine Unit Treatment Team     Target symptoms: Substance Abuse     Patient's response to treatment: Not Participating     Progress toward goals: Not progressing     Interval History: Pt did not attend group     Diagnosis:      Plan: Continue treatment on BMU

## 2023-09-22 NOTE — HPI
"Glenn Ruiz is a 53 y.o. male with PPH of schizophrenia and substance abuse who presents to [facility] with above chief complaint. Pt seen and chart reviewed. Pt is new to me and new to the [facility] system. Per Care Everywhere, pt was in ED at [facility] in August 2023 with psychosis and was transferred to a psychiatric facility from there.     Pt presents for interview appearing somewhat disheveled. He is very guarded/reticent, history is incredibly vague to the point that it is difficult to gather any history. Responds to most questions with "possibly" or "I don't recall." At times his gives conflicting answers, reliability is overall poor. He reports he was dx with schizophrenia "a couple of months ago possibly" and was recently admitted to in psych somewhere in [place], but does not recall the name of the facility. States that he did not take any of the meds they prescribed once he left because "I don't believe in those medicines." He admits to using meth and THC 1-2x per week, possibly minimizing use to some degree. First states sleep has been poor at home, then says he's sleeping "normal." He does admit to h/o AH - "I have heard a few voices before in my past, voices from God." States he has "possibly" been hearing these voices lately and "possibly" some AH from devil/demons as well. When asked to clarify his previous complaint of "old souls" causing him internal pain, he states he can't describe it at this time. Has exhibited paranoid behavior on the unit and RIS has been observed by staff. He denies any current SI/HI/plan/intent and denies h/o suicide attempt or violence. He lives with his mother - hopefully she can provide some helpful collateral information. He is agreeable with restarting Risperdal, which per Care Everywhere were prescribed to him around July 2023. Labs reviewed, elevated Cr 1.3 (likely dehydration) and elevated LFTs, UDS+ THC and amph.    Past Psychiatric History:   Previous Psychiatric " "Hospitalizations: "a couple of times"   Previous Medication Trials: trileptal,   Previous Suicide Attempts: denies   History of Violence: denies   Outpatient psychiatrist: none currently     Substance Abuse History:   Recreational Drugs: methamphetamine and THC 1-2x per week   Use of Alcohol: not really   Tobacco Use: denies   Rehab History: yes, doesn't recall last time or where     Social History:   Developmental: wnl   Education: bachelors in general studies   Special Ed: denies   Employment Status/Info: unemployed currently, has worked as a HS teacher   Marital Status:    Children: 2 sons   Housing Status: with mom  History of phys/sexual abuse: denies   Access to firearm: denies     Family Psychiatric History:   Denies   "

## 2023-09-22 NOTE — NURSING
PRN Administration Note:    Behavior:    Patient (Glenn Ruiz is a 53 y.o. male, : 1970, MRN: [id number])     Allergies: Patient has no allergy information on record.    Glenn's  height is 6' (1.829 m) and weight is 100.3 kg (221 lb 1.9 oz). His temperature is 97.7 °F (36.5 °C). His blood pressure is 132/81 and his pulse is 61. His respiration is 20 and oxygen saturation is 96%.     Reason for PRN Administration: _anxiety and restlessness, patient reportedly talking to self in room____.    Intervention:    Administered _Vistarill 50 mg PO__ per physician order to Glenn       Response:    Glenn tolerated administration well.      Plan:     Continue to monitor per MD/PA/APRN orders; and reevaluate medication effectiveness within 30 minutes.
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
## 2023-09-22 NOTE — H&P
"[facility]  Psychiatry  History & Physical    Patient Name: Glenn Ruiz  MRN: [id number]   Code Status: Full Code  Admission Date: 9/21/2023  Attending Physician: Eloy Pulido MD   Primary Care Provider: Mony, Primary Doctor    Current Legal Status: PEC    Patient information was obtained from patient and ER records.     Subjective:     Pt seen via telemedicine with synchronous voice and video telecommunication. Pt was located in [place], [place] and provider located in [place], [place].     Principal Problem:Schizophrenia    Chief Complaint:  Psychosis, bizarre thoughts     HPI: Glenn Ruiz is a 53 y.o. male with PPH of schizophrenia and substance abuse who presents to [facility] with above chief complaint. Pt seen and chart reviewed. Pt is new to me and new to the [facility] system. Per Care Everywhere, pt was in ED at [facility] in August 2023 with psychosis and was transferred to a psychiatric facility from there.     Pt presents for interview appearing somewhat disheveled. He is very guarded/reticent, history is incredibly vague to the point that it is difficult to gather any history. Responds to most questions with "possibly" or "I don't recall." At times his gives conflicting answers, reliability is overall poor. He reports he was dx with schizophrenia "a couple of months ago possibly" and was recently admitted to in psych somewhere in [place], but does not recall the name of the facility. States that he did not take any of the meds they prescribed once he left because "I don't believe in those medicines." He admits to using meth and THC 1-2x per week, possibly minimizing use to some degree. First states sleep has been poor at home, then says he's sleeping "normal." He does admit to h/o AH - "I have heard a few voices before in my past, voices from God." States he has "possibly" been hearing these voices lately and "possibly" some AH from devil/demons as well. When asked to clarify his " "previous complaint of "old souls" causing him internal pain, he states he can't describe it at this time. Has exhibited paranoid behavior on the unit and RIS has been observed by staff. He denies any current SI/HI/plan/intent and denies h/o suicide attempt or violence. He lives with his mother - hopefully she can provide some helpful collateral information. He is agreeable with restarting Risperdal, which per Care Everywhere were prescribed to him around July 2023. Labs reviewed, elevated Cr 1.3 (likely dehydration) and elevated LFTs, UDS+ THC and amph.    Past Psychiatric History:   Previous Psychiatric Hospitalizations: "a couple of times"   Previous Medication Trials: trileptal,   Previous Suicide Attempts: denies   History of Violence: denies   Outpatient psychiatrist: none currently     Substance Abuse History:   Recreational Drugs: methamphetamine and THC 1-2x per week   Use of Alcohol: not really   Tobacco Use: denies   Rehab History: yes, doesn't recall last time or where     Social History:   Developmental: wnl   Education: bachelors in general studies   Special Ed: denies   Employment Status/Info: unemployed currently, has worked as a [employer] teacher   Marital Status:    Children: 2 sons   Housing Status: with mom  History of phys/sexual abuse: denies   Access to firearm: denies     Family Psychiatric History:   Denies            Patient History               Medical as of 9/22/2023    Past Medical History: Patient provided no pertinent medical history.               Surgical as of 9/22/2023    Past Surgical History: Patient provided no pertinent surgical history.               Family as of 9/22/2023    None               Tobacco Use as of 9/22/2023       Smoking Status Smoking Start Date Quit Date Current Packs/Day Average Packs/Day    Never Assessed -- -- --       Smokeless Status Smokeless Type Smokeless Quit Date    Unknown -- --      Source    --                  Alcohol Use as of 9/22/2023       " Alcohol Use Drinks/Week Alcohol/Week Comments Source    Not Currently   -- -- Provider                  Drug Use as of 9/22/2023       Drug Use Types Frequency Comments Source    Yes  Marijuana, Methamphetamines -- -- Provider                  Sexual Activity as of 9/22/2023    None               Activities of Daily Living as of 9/22/2023    None               Social Documentation as of 9/22/2023    None               Occupational as of 9/22/2023    None               Socioeconomic as of 9/22/2023       Marital Status Spouse Name Number of Children Years Education Education Level Preferred Language Ethnicity Race Source    Single -- -- -- -- English Not  or /a White --                  Pertinent History       Question Response Comments    Lives with -- --    Place in Birth Order -- --    Lives in -- --    Number of Siblings -- --    Raised by -- --    Legal Involvement -- --    Childhood Trauma -- --    Criminal History of -- --    Financial Status -- --    Highest Level of Education -- --    Does patient have access to a firearm? -- --     Service -- --    Primary Leisure Activity -- --    Spirituality -- --          No past medical history on file.  No past surgical history on file.  Family History    None       Tobacco Use    Smoking status: Not on file    Smokeless tobacco: Not on file   Substance and Sexual Activity    Alcohol use: Not Currently    Drug use: Yes     Types: Marijuana, Methamphetamines    Sexual activity: Not on file     Review of patient's allergies indicates:  No Known Allergies    Current Facility-Administered Medications on File Prior to Encounter   Medication    [COMPLETED] ziprasidone injection 20 mg     Current Outpatient Medications on File Prior to Encounter   Medication Sig    amLODIPine (NORVASC) 10 MG tablet Take 10 mg by mouth Daily.    atorvastatin (LIPITOR) 40 MG tablet Take 40 mg by mouth nightly.    pantoprazole (PROTONIX) 40 MG tablet Take 40 mg by  mouth Daily.    [DISCONTINUED] divalproex (DEPAKOTE) 250 MG EC tablet Take 250 mg by mouth 2 (two) times daily.    [DISCONTINUED] EScitalopram oxalate (LEXAPRO) 10 MG tablet Take 10 mg by mouth Daily.    [DISCONTINUED] indomethacin (INDOCIN) 50 MG capsule Take 50 mg by mouth 3 (three) times daily.     Psychotherapeutics (From admission, onward)      None          Review of Systems   Constitutional:  Negative for chills and fever.   HENT:  Negative for congestion and sore throat.    Eyes:  Negative for visual disturbance.   Respiratory:  Negative for cough and shortness of breath.    Cardiovascular:  Negative for chest pain and palpitations.   Gastrointestinal:  Negative for diarrhea, nausea and vomiting.   Endocrine: Negative for cold intolerance and polyuria.   Genitourinary:  Negative for dysuria and flank pain.   Musculoskeletal:  Negative for back pain and joint swelling.   Skin:  Negative for rash.   Allergic/Immunologic: Negative for immunocompromised state.   Neurological:  Negative for seizures and headaches.   Psychiatric/Behavioral:  Positive for hallucinations. The patient is nervous/anxious.      Strengths and Liabilities: Strength: Patient is expressive/articulate., Strength: Patient is intelligent., Liability: Patient has poor judgment, Liability: Patient is unstable.    Objective:     Vital Signs (Most Recent):  Temp: 98.1 °F (36.7 °C) (09/22/23 0612)  Pulse: 65 (09/22/23 0612)  Resp: 20 (09/22/23 0612)  BP: 126/81 (09/22/23 0612)  SpO2: 97 % (09/22/23 0612) Vital Signs (24h Range):  Temp:  [97.7 °F (36.5 °C)-98.1 °F (36.7 °C)] 98.1 °F (36.7 °C)  Pulse:  [61-66] 65  Resp:  [18-20] 20  SpO2:  [96 %-100 %] 97 %  BP: (126-153)/() 126/81     Height: 6' (182.9 cm)  Weight: 100.3 kg (221 lb 1.9 oz)  Body mass index is 29.99 kg/m².    No intake or output data in the 24 hours ending 09/22/23 1020       Physical Exam  Musculosekeletal Exam:  Muscle Stength/Tone: appears wnl  Gait:  normal/steady  Involuntary Movements: no overt tremor or involuntary movements observed    Mental Status Exam:  Appearance: disheveled and dressed in hospital scrubs  Sensorium: alert  Orientation: oriented to person, place, and time  Behavior/Cooperation: reticent and suspicious  Psychomotor activity: No tremor or involuntary movements observed. No psychomotor retardation or agitation  Speech:  minimal, brief responses  Language: fluent English, naming and word repetition intact   Affect: guarded, anxious  Mood: anxious  Thought Process:  difficult to assess, possibly some disorganization per observations by staff  Associations: no loosening of associations  Thought Content: denies SI/HI/plan/intent and +paranoia evident  Thought Perceptions: +AH  Memory: Unable to assess  Attention/Concentration: Impaired to some degree  Fund of Knowledge: history, vocabulary, and fund of knowledge suggest intellectual functioning in the normal range  Insight: poor  Judgment: poor        Significant Labs:   Recent Results (from the past 48 hour(s))   Urinalysis, Reflex to Urine Culture    Collection Time: 09/21/23  9:57 AM    Specimen: Urine   Result Value Ref Range    Color, UA Dark Yellow Yellow, Light-Yellow, Dark Yellow, Amy, Straw    Appearance, UA Clear Clear    Specific Gravity, UA 1.028 1.005 - 1.030    pH, UA 5.5 5.0 - 8.5    Protein, UA 1+ (A) Negative    Glucose, UA Negative Negative, Normal    Ketones, UA Trace (A) Negative    Blood, UA 1+ (A) Negative    Bilirubin, UA Negative Negative    Urobilinogen, UA 1.0 0.2, 1.0, Normal    Nitrites, UA Negative Negative    Leukocyte Esterase, UA Negative Negative   Drug Screen, Urine    Collection Time: 09/21/23  9:57 AM   Result Value Ref Range    Amphetamines, Urine Positive (A) Negative    Barbituates, Urine Negative Negative    Benzodiazepine, Urine Negative Negative    Cannabinoids, Urine Positive (A) Negative    Cocaine, Urine Negative Negative    Fentanyl, Urine  Negative Negative    MDMA, Urine Negative Negative    Opiates, Urine Negative Negative    Phencyclidine, Urine Negative Negative    pH, Urine 5.5 3.0 - 11.0   Urinalysis, Microscopic    Collection Time: 09/21/23  9:57 AM   Result Value Ref Range    RBC, UA 14 (H) <=5 /HPF    WBC, UA <5 <=5 /HPF    Squamous Epithelial Cells, UA <5 <=5 /HPF    Bacteria, UA None Seen None Seen, Rare, Occasional /HPF   Comprehensive metabolic panel    Collection Time: 09/21/23 10:03 AM   Result Value Ref Range    Sodium Level 140 136 - 145 mmol/L    Potassium Level 3.7 3.5 - 5.1 mmol/L    Chloride 107 98 - 107 mmol/L    Carbon Dioxide 21 (L) 22 - 29 mmol/L    Glucose Level 114 (H) 74 - 100 mg/dL    Blood Urea Nitrogen 29.6 (H) 8.4 - 25.7 mg/dL    Creatinine 1.30 (H) 0.73 - 1.18 mg/dL    Calcium Level Total 10.1 8.4 - 10.2 mg/dL    Protein Total 7.6 6.4 - 8.3 gm/dL    Albumin Level 5.0 3.5 - 5.0 g/dL    Globulin 2.6 2.4 - 3.5 gm/dL    Albumin/Globulin Ratio 1.9 1.1 - 2.0 ratio    Bilirubin Total 1.7 (H) <=1.5 mg/dL    Alkaline Phosphatase 78 40 - 150 unit/L    Alanine Aminotransferase 132 (H) 0 - 55 unit/L    Aspartate Aminotransferase 128 (H) 5 - 34 unit/L    eGFR >60 mls/min/1.73/m2   TSH    Collection Time: 09/21/23 10:03 AM   Result Value Ref Range    Thyroid Stimulating Hormone 0.986 0.350 - 4.940 uIU/mL   Ethanol    Collection Time: 09/21/23 10:03 AM   Result Value Ref Range    Ethanol Level <10.0 <=10.0 mg/dL   Acetaminophen level    Collection Time: 09/21/23 10:03 AM   Result Value Ref Range    Acetaminophen Level <17.4 (L) 17.4 - 30.0 ug/ml   Salicylate level    Collection Time: 09/21/23 10:03 AM   Result Value Ref Range    Salicylate Level <5.0 (L) 15.0 - 30.0 mg/dL   CBC with Differential    Collection Time: 09/21/23 10:03 AM   Result Value Ref Range    WBC 8.51 4.50 - 11.50 x10(3)/mcL    RBC 5.25 4.70 - 6.10 x10(6)/mcL    Hgb 16.1 14.0 - 18.0 g/dL    Hct 44.7 42.0 - 52.0 %    MCV 85.1 80.0 - 94.0 fL    MCH 30.7 27.0 - 31.0 pg  "   MCHC 36.0 33.0 - 36.0 g/dL    RDW 12.6 11.5 - 17.0 %    Platelet 206 130 - 400 x10(3)/mcL    MPV 9.7 7.4 - 10.4 fL    Neut % 72.5 %    Lymph % 17.6 %    Mono % 8.5 %    Eos % 0.8 %    Basophil % 0.4 %    Lymph # 1.50 0.6 - 4.6 x10(3)/mcL    Neut # 6.17 2.1 - 9.2 x10(3)/mcL    Mono # 0.72 0.1 - 1.3 x10(3)/mcL    Eos # 0.07 0 - 0.9 x10(3)/mcL    Baso # 0.03 <=0.2 x10(3)/mcL    IG# 0.02 0 - 0.04 x10(3)/mcL    IG% 0.2 %    NRBC% 0.0 %   COVID/FLU A&B PCR    Collection Time: 09/21/23 12:59 PM   Result Value Ref Range    Influenza A PCR Not Detected Not Detected    Influenza B PCR Not Detected Not Detected    SARS-CoV-2 PCR Not Detected Not Detected, Negative, Invalid   Lipid panel    Collection Time: 09/22/23  4:23 AM   Result Value Ref Range    Cholesterol Total 137 <=200 mg/dL    HDL Cholesterol 33 (L) 35 - 60 mg/dL    Triglyceride 115 34 - 140 mg/dL    Cholesterol/HDL Ratio 4 0 - 5    Very Low Density Lipoprotein 23     LDL Cholesterol 81.00 50.00 - 140.00 mg/dL   Glucose, Fasting    Collection Time: 09/22/23  4:23 AM   Result Value Ref Range    Glucose Fasting 121 70 - 155 mg/dL      No results found for: "PHENYTOIN", "PHENOBARB", "VALPROATE", "CBMZ"    Significant Imaging: None    Assessment/Plan:     * Schizophrenia  - Obtain collateral from mother or other family for further history - pt gave verbal consent to contact during this assessment  - Obtain records from previous tx facility if identified  - Start Risperdal 1mg PO BID for psychosis - consider use of MATHEW prior to discharge d/t h/o med nonadherence       Estimated Discharge Date:   Initial Discharge Plan: Home    Prognosis: Guarded    Need for Continued Hospitalization:   Psychiatric illness continues to pose a potential threat to life or bodily function, of self or others, thereby requiring the need for continued inpatient psychiatric hospitalization. and Requires ongoing hospitalization for stabilization of medications.    Liam Ansari MD  Psychiatry - " [facility]  09/22/2023 10:25 AM

## 2023-09-23 PROCEDURE — 25000003 PHARM REV CODE 250: Performed by: PSYCHIATRY & NEUROLOGY

## 2023-09-23 PROCEDURE — 11400000 HC PSYCH PRIVATE ROOM

## 2023-09-23 RX ADMIN — HYDROXYZINE PAMOATE 50 MG: 25 CAPSULE ORAL at 05:09

## 2023-09-23 RX ADMIN — AMLODIPINE BESYLATE 10 MG: 5 TABLET ORAL at 04:09

## 2023-09-23 RX ADMIN — PANTOPRAZOLE SODIUM 40 MG: 40 TABLET, DELAYED RELEASE ORAL at 04:09

## 2023-09-23 RX ADMIN — RISPERIDONE 1 MG: 1 TABLET ORAL at 08:09

## 2023-09-23 RX ADMIN — ATORVASTATIN CALCIUM 40 MG: 40 TABLET, FILM COATED ORAL at 08:09

## 2023-09-23 NOTE — NURSING
Daily Nursing Note:      Behavior:    Patient (Glenn Ruiz is a 53 y.o. male, : 1970, MRN: [id number]) demonstrating an affect that was flat. Glenn demonstrating mood that is depressed. Glenn had an appearance that was disheveled. Glenn denies suicidal ideation. Glenn denies suicide plan. Glenn denies homicidal ideation. Glenn endorses hallucinations.    Glenn's  height is 6' (1.829 m) and weight is 100.3 kg (221 lb 1.9 oz). His oral temperature is 98.2 °F (36.8 °C). His blood pressure is 129/81 and his pulse is 68. His respiration is 18 and oxygen saturation is 97%.       Intervention:    Encourage Glenn to perform self-hygiene, grooming, and changing of clothing. Monitor Glenn's behavior and program compliance. Monitor Glenn for suicidal ideation, homicidal ideation, sleep disturbance, and hallucinations. Encourage Glenn to eat all portions of meals and assess for meal preferences. Monitor Glenn for intake and output to ensure hydration. Notify the Physician/Physician Assistant/Advance Practice Registered Nurse (MD/PA/APRN) for any medication refusal and any change in patient condition.      Response:    Glenn verbalizes understand of unit process and procedures. Glenn is withdrawn with limited self-disclosure. No current complaints are voiced.    Plan:     Continue to monitor per MD/PA/APRN orders; maintain patient safety.

## 2023-09-23 NOTE — NURSING
"Daily Nursing Note:      Behavior:    Patient (Glenn Ruiz is a 53 y.o. male, : 1970, MRN: [id number]) demonstrating an affect that was flat, anxious, and .... Glenn demonstrating mood that is depressed and anxious. Glenn had an appearance that was disheveled. Glenn denies suicidal ideation. Glenn denies suicide plan. Glenn denies homicidal ideation. Glenn denies hallucinations but has been observed rtis.     Glenn's  height is 6' (1.829 m) and weight is 100.3 kg (221 lb 1.9 oz). His oral temperature is 98 °F (36.7 °C). His blood pressure is 131/81 and his pulse is 64. His respiration is 18 and oxygen saturation is 97%.     Citlallis last BM was noted on: 23      Intervention:    Encourage Glenn to perform self-hygiene, grooming, and changing of clothing. Monitor Glenn's behavior and program compliance. Monitor Glenn for suicidal ideation, homicidal ideation, sleep disturbance, and hallucinations. Encourage Glenn to eat all portions of meals and assess for meal preferences. Monitor Glenn for intake and output to ensure hydration. Notify the Physician/Physician Assistant/Advance Practice Registered Nurse (MD/PA/APRN) for any medication refusal and any change in patient condition.      Response:    Glenn verbalizes understand of unit process and procedures. He is avoidant and isolates in room. Reports to being "exhausted". Compliant with medications, no adverse effects observed or reported.      Plan:     Continue to monitor per MD/PA/APRN orders; maintain patient safety.   "

## 2023-09-23 NOTE — GROUP NOTE
Group Psychotherapy       Group Focus: Promoting Healthy Lifestyles      Number of patients in attendance: 4    Group Start Time: 1400  Group End Time:  1445  Groups Date: 9/23/2023  Group Topic:  Behavioral Health  Group Department: [facility]  Group Facilitators:  Preeti Collazo LPN  _____________________________________________________________________    Patient Name: Glenn Ruiz  MRN: [id number]  Patient Class: IP- Psych   Admission Date\Time: 9/21/2023  8:23 PM  Hospital Length of Stay: 2  Primary Care Provider: Mony, Primary Doctor     Referred by: Behavioral Medicine Unit Treatment Team     Target symptoms: Psychosis     Patient's response to treatment: n/a     Progress toward goals: N/A     Interval History: Glenn did not attend     Diagnosis: Schizophrenia     Plan: Continue treatment on BMU

## 2023-09-23 NOTE — PLAN OF CARE
Problem: Behavior Regulation Impairment (Psychotic Signs/Symptoms)  Goal: Improved Behavioral Control (Psychotic Signs/Symptoms)  Outcome: Ongoing, Progressing     Problem: Cognitive Impairment (Psychotic Signs/Symptoms)  Goal: Optimal Cognitive Function (Psychotic Signs/Symptoms)  Outcome: Ongoing, Progressing     Problem: Psychomotor Impairment (Psychotic Signs/Symptoms)  Goal: Improved Psychomotor Symptoms (Psychotic Signs/Symptoms)  Outcome: Ongoing, Progressing     Problem: Behavior Regulation Impairment (Excessive Substance Use)  Goal: Improved Behavioral Control (Excessive Substance Use)  Outcome: Ongoing, Progressing

## 2023-09-24 PROCEDURE — S4991 NICOTINE PATCH NONLEGEND: HCPCS | Performed by: PSYCHIATRY & NEUROLOGY

## 2023-09-24 PROCEDURE — 25000003 PHARM REV CODE 250: Performed by: PSYCHIATRY & NEUROLOGY

## 2023-09-24 PROCEDURE — 11400000 HC PSYCH PRIVATE ROOM

## 2023-09-24 RX ORDER — IBUPROFEN 200 MG
1 TABLET ORAL DAILY
Status: DISCONTINUED | OUTPATIENT
Start: 2023-09-24 | End: 2023-09-25

## 2023-09-24 RX ADMIN — IBUPROFEN 400 MG: 400 TABLET, FILM COATED ORAL at 03:09

## 2023-09-24 RX ADMIN — HYDROXYZINE PAMOATE 50 MG: 25 CAPSULE ORAL at 08:09

## 2023-09-24 RX ADMIN — PANTOPRAZOLE SODIUM 40 MG: 40 TABLET, DELAYED RELEASE ORAL at 04:09

## 2023-09-24 RX ADMIN — RISPERIDONE 1 MG: 1 TABLET ORAL at 08:09

## 2023-09-24 RX ADMIN — HYDROXYZINE PAMOATE 50 MG: 25 CAPSULE ORAL at 03:09

## 2023-09-24 RX ADMIN — ATORVASTATIN CALCIUM 40 MG: 40 TABLET, FILM COATED ORAL at 08:09

## 2023-09-24 RX ADMIN — NICOTINE 1 PATCH: 14 PATCH, EXTENDED RELEASE TRANSDERMAL at 03:09

## 2023-09-24 RX ADMIN — AMLODIPINE BESYLATE 10 MG: 5 TABLET ORAL at 04:09

## 2023-09-24 NOTE — GROUP NOTE
Group Psychotherapy       Group Focus: Social interaction      Number of patients in attendance: 9    Group Start Time: 1400  Group End Time:  1445  Groups Date: 9/24/2023  Group Topic:  Behavioral Health  Group Department: [facility]  Group Facilitators:  Preeti Collazo LPN  _____________________________________________________________________    Patient Name: Glenn Ruiz  MRN: [id number]  Patient Class: IP- Psych   Admission Date\Time: 9/21/2023  8:23 PM  Hospital Length of Stay: 3  Primary Care Provider: Mony, Primary Doctor     Referred by: Behavioral Medicine Unit Treatment Team     Target symptoms: Psychosis     Patient's response to treatment: Active Listening and Self-disclosure     Progress toward goals: Progressing slowly     Interval History: Participated with good interaction     Diagnosis: Schizophrenia     Plan: Continue treatment on BMU

## 2023-09-24 NOTE — NURSING
Pt requested Nicotine patch.  Patch ordered and applied.  Education provided to Pt about application and disposal of patch.  Pt verbalized understanding.  Approximately 1hr later Patch was found in Dayroom trash can.  Charge Nurse informed.

## 2023-09-24 NOTE — GROUP NOTE
Nursing Group       Group Focus: Symptoms Management      Number of patients in attendance: 9    Group Start Time: 1615  Group End Time:  1700  Groups Date: 9/24/2023  Group Topic:  Behavioral Health  Group Department: [facility]  Group Facilitators:  Hannah Quiñones RN  _____________________________________________________________________    Patient Name: Glenn Ruiz  MRN: [id number]  Patient Class: IP- Psych   Admission Date\Time: 9/21/2023  8:23 PM  Hospital Length of Stay: 3  Primary Care Provider: Mony Primary Doctor     Referred by: Behavioral Medicine Unit Treatment Team     Target symptoms: Substance Abuse, Anxiety, and Poor Coping Skills     Patient's response to treatment: Not Participating     Progress toward goals: Not progressing     Interval History: Attended with minimal self disclosure poor response     Diagnosis: Schizophrenia     Plan: Continue treatment on BMU

## 2023-09-25 PROCEDURE — 25000003 PHARM REV CODE 250: Performed by: PSYCHIATRY & NEUROLOGY

## 2023-09-25 PROCEDURE — 11400000 HC PSYCH PRIVATE ROOM

## 2023-09-25 RX ADMIN — RISPERIDONE 1 MG: 1 TABLET ORAL at 08:09

## 2023-09-25 RX ADMIN — IBUPROFEN 400 MG: 400 TABLET, FILM COATED ORAL at 09:09

## 2023-09-25 RX ADMIN — HYDROXYZINE PAMOATE 50 MG: 25 CAPSULE ORAL at 08:09

## 2023-09-25 RX ADMIN — HYDROXYZINE PAMOATE 50 MG: 25 CAPSULE ORAL at 02:09

## 2023-09-25 RX ADMIN — AMLODIPINE BESYLATE 10 MG: 5 TABLET ORAL at 04:09

## 2023-09-25 RX ADMIN — ATORVASTATIN CALCIUM 40 MG: 40 TABLET, FILM COATED ORAL at 08:09

## 2023-09-25 RX ADMIN — PANTOPRAZOLE SODIUM 40 MG: 40 TABLET, DELAYED RELEASE ORAL at 04:09

## 2023-09-25 NOTE — GROUP NOTE
Group Psychotherapy       Group Focus: Psychodynamic Group Psychotherapy      Number of patients in attendance: 8    Group Start Time: 0900  Group End Time:  0945  Groups Date: 9/25/2023  Group Topic:  Behavioral Health  Group Department: [facility]  Group Facilitators:  Bronson Lockett LCSW  _____________________________________________________________________    Patient Name: Glenn Ruiz  MRN: [id number]  Patient Class: IP- Psych   Admission Date\Time: 9/21/2023  8:23 PM  Hospital Length of Stay: 4  Primary Care Provider: Mony Primary Doctor     Referred by: Behavioral Medicine Unit Treatment Team     Target symptoms: Mood Disorder     Patient's response to treatment: Active Listening and Self-disclosure     Progress toward goals: Progressing adequately     Interval History: Group discussed criteria for admission to psych facility and criteria for discharge that we consider with each patient.     Diagnosis:      Plan: Continue treatment on BMU

## 2023-09-25 NOTE — PROGRESS NOTES
09/24/23 2028   Pain/Comfort/Sleep   Pain Rating (0-10): Rest 0   Pain Rating: Rest 0 - no pain   FACES Pain Rating: Rest 0-->no hurt   Cognitive/Neuro/Behavioral WDL   Cognitive/Neuro/Behavioral WDL WDL   Behavioral   General Appearance [WDL Definition: Well-kept, clean; dress appropriate for weather/appropriate for setting] WDL   Behavior WDL   Behavior [WDL Definition: Appropriate to situation, cooperative, appropriate eye contact; erect posture, head raised, steady gait; no unusual gestures/mannerisms] interactions   Behavior Interactions avoids social contact;guarded   Motor Movement restless   Emotion Mood WDL   Emotion/Mood/Affect [WDL Definition: Calm; euthymic; affect consistent with mood; facial expression relaxed, appropriate to situation] affect;emotion mood   Affect restricted;blunted   Emotion/Mood/Affect Symptoms anxious   Speech WDL   Speech [WDL Definition: Moderate rate and volume; clear, coherent; articulate; effective] WDL   Perceptual State WDL   Perceptual State [WDL Definition: Consistent with reality; denies hallucinations] WDL except   Hallucinations denies hallucinations   Perceptual State derealization   Thought Process WDL   Thought Process [WDL Definition: Judgment and insight appropriate to situation; logical, relevant, and linear thought process] judgment and insight   Delusions no delusions   Judgment and Insight insight not appropriate to situation;judgment not appropriate to situation   Thought Content preoccupation   Thought Process Symptoms circumstantial thought   Intellectual Performance WDL   Intellectual Performance [WDL Definition: Alert, oriented x 4; immediate, recent and remote memory intact; able to comprehend] WDL   Skin   Skin WDL WDL   Madi Risk Assessment   Sensory Perception 4-->no impairment   Moisture 4-->rarely moist   Activity 4-->walks frequently   Mobility 4-->no limitation   Nutrition 3-->adequate   Friction and Shear 3-->no apparent problem   Madi  Score 22   Gastrointestinal   GI WDL WDL   Genitourinary   Genitourinary WDL WDL   Safety   Observed Behavior pacing   [place] Suicide Severity Rating Scale   1. Wish to be Dead: Have you wished you were dead or wished you could go to sleep and not wake up? No   2. Suicidal Thoughts: Have you actually had any thoughts of killing yourself? No   6. Suicide Behavior Question: Have you ever done anything, started to do anything, or prepared to do anything to end your life? No   Suicide Risk No Risk   Chika Psychiatric Fall Risk Tool   Age 10-->50 - 79   Mental Status -4-->Fully alert/oriented at all times   Elimination 8-->Independent with control of bowel/bladder   Medications 8-->Psychotropic medications   Diagnosis 8-->Substance abuse/alcohol abuse   Ambulation/Balance 7-->Independent/steady gait/immobile   Nutrition 0-->No apparent abnormalities with appetite   Sleep Disturbance 8-->No sleep disturbance   History of Falls 8-->No history of falls   Score (Chika Fall Risk) 53   Violence Risk Screening-10   Previous and/or current violence No   Previous and/or current threats (verbal/physical) No   Shows lack of insight into illness and/or behavior Yes   Shows lack of empathy No   RN Clinical Review   I have evaluated the data collected on this patient and nursing care provided. Done

## 2023-09-25 NOTE — PROGRESS NOTES
Recreation Therapy Progress Note    Date: 9 25 2023     Time: 1400 hour group    Group Title: leisure skills    Mood: anxious and restless    Behavior: participated with prompts     Affect: anxious     Speech: normal    Cognition: alert but distracted a lot in thoughts process.    Participation Level: 70% with prompts to try to keep his focus     Intervention:  Cont rt services.         Recreation Therapist   Signature: Verónica Gracia MA CTRS

## 2023-09-25 NOTE — PROGRESS NOTES
Recreation Therapy Progress Note    Date: 9 25 2023     Time: 10:00 am group session     Group Title: creative expression     Mood: anxious and withdrawn ...     Behavior: withdrawn     Affect: flat and     Speech: normal     Cognition: distracted thoughts.     Participation Level: pt came to group for 5 minutes then left out . Pt guarded  and wanted to be by himself.  I tried to motivate pt to stay in group .    Intervention:cont to offer pt rt services.           Recreation Therapist   Signature: Verónica soriano MA CTRS

## 2023-09-25 NOTE — NURSING
"Daily Nursing Note:      Behavior:    Patient (Glenn Ruiz is a 53 y.o. male, : 1970, MRN: [id number]) demonstrating an affect that was flat and anxious. Glenn demonstrating mood that is anxious. Glenn had an appearance that was disheveled. Glenn denies suicidal ideation. Glenn denies suicide plan. Glenn denies homicidal ideation. Glenn endorses hallucinations but that they are diminishing and manageable without difficulty..    Glenn's  height is 6' (1.829 m) and weight is 100.4 kg (221 lb 5.5 oz). His oral temperature is 98.4 °F (36.9 °C). His blood pressure is 145/82 (abnormal) and his pulse is 62. His respiration is 18 and oxygen saturation is 97%.     Citlallis last BM was noted on:23    Intervention:    Encourage Glenn to perform self-hygiene, grooming, and changing of clothing. Monitor Glenn's behavior and program compliance. Monitor Glenn for suicidal ideation, homicidal ideation, sleep disturbance, and hallucinations. Encourage Glenn to eat all portions of meals and assess for meal preferences. Monitor Glenn for intake and output to ensure hydration. Notify the Physician/Physician Assistant/Advance Practice Registered Nurse (MD/PA/APRN) for any medication refusal and any change in patient condition.      Response:    Glenn verbalizes understand of unit process and procedures. Glenn reported medications are working "fine".      Plan:     Continue to monitor per MD/PA/APRN orders; maintain patient safety.   "

## 2023-09-25 NOTE — PROGRESS NOTES
"9/25/2023  Glenn Ruiz   1970   [id number]        Psychiatry Progress Note     Chief Complaint: "I got caught up in a little meth"    SUBJECTIVE:   Glenn Ruiz is a 53 y.o. male admitted for psychosis and bizarre behavior. He reports today that he had "got caught up with meth; the issue comes and goes". He claims that he has only been using once or twice a week but that he needed to get a handle on it before he could not longer control his usage. "I used to use pain pills but I have been sober from that for years; I know how easy it is to get caught up". He claims that he has stopped using meth a few times before so he doesn't feel that it is a true issue for him. His family has offered to fly him to treatment and pay for everything but he is not interested. He stated that he has been "negligent on my medications and I have made a commitment to my mother to comply with medications so I am in agreeance with continuing them. Since starting the Risperdal he reports that the hallucinations have lessened. He is planning on returning to his home in [place] and stated that "if he happens to relapse" he will consider an outpatient program at that time.        Current Medications:   Scheduled Meds:    amLODIPine  10 mg Oral Daily    atorvastatin  40 mg Oral Nightly    pantoprazole  40 mg Oral Daily    risperiDONE  1 mg Oral BID      PRN Meds: acetaminophen, aluminum-magnesium hydroxide-simethicone, hydrOXYzine pamoate, ibuprofen   Psychotherapeutics (From admission, onward)      Start     Stop Route Frequency Ordered    09/22/23 1030  risperiDONE tablet 1 mg         -- Oral 2 times daily 09/22/23 1026            Allergies:   Review of patient's allergies indicates:  No Known Allergies     OBJECTIVE:   Vitals   Vitals:    09/25/23 0604   BP: 128/83   Pulse: (!) 58   Resp: 16   Temp: 98.1 °F (36.7 °C)        Labs/Imaging/Studies:   No results found for this or any previous visit (from the past 36 hour(s)).       Medical " Review Of Systems:  A comprehensive review of systems was negative.      Psychiatric Mental Status Exam:  General Appearance: dressed in hospital garb, disheveled, malodorous  Arousal: alert  Behavior: appropriate eye-contact, somewhat guarded  Movements and Motor Activity: no abnormal involuntary movements noted; no tics, no tremors, no akathisia, no dystonia, no evidence of tardive dyskinesia; no psychomotor agitation or retardation  Orientation: oriented to person, place, time, and situation  Speech: normal rate, rhythm, volume, tone and pitch  Mood: Anxious and Guarded  Affect: mood-congruent  Thought Process: racing  Associations: no loosening of associations  Thought Content and Perceptions: + auditory hallucinations  Recent and Remote Memory: intact, recent memory intact, remote memory intact; per interview/observation with patient  Attention and Concentration: intact; per interview/observation with patient  Fund of Knowledge: intact, aware of current events, vocabulary appropriate; based on history, vocabulary, fund of knowledge, syntax, grammar, and content  Insight: poor; based on understanding of severity of illness and HPI  Judgment: poor; based on patient's behavior and HPI    ASSESSMENT/PLAN:   Problems Addressed/Diagnoses:  SCHIZOPHRENIA AND OTHER PSYCHOTIC DISORDERS; Schziophrenia: Paranoid Type      Methamphetamine use disorder    Plan:  Continue all medications as ordered    Expected Disposition Plan: Substance treatment program is recommeded but he has refused  Outpatient medication management        EMERSON Cardoza, [facility]  Psychiatry  [facility]

## 2023-09-25 NOTE — NURSING
Daily Nursing Note:      Behavior:    Patient (Glenn Ruiz is a 53 y.o. male, : 1970, MRN: [id number]) demonstrating an affect that was congruent. Glenn demonstrating mood that is depressed and anxious. Glenn had an appearance that was clean. Glenn denies suicidal ideation. Glenn denies suicide plan. Glenn denies homicidal ideation. Glenn denies hallucinations.    Glenn's  height is 6' (1.829 m) and weight is 100.4 kg (221 lb 5.5 oz). His oral temperature is 98.3 °F (36.8 °C). His blood pressure is 142/84 (abnormal) and his pulse is 55 (abnormal). His respiration is 20 and oxygen saturation is 98%.     Citlallis last BM was noted on: 23      Intervention:    Encourage Glenn to perform self-hygiene, grooming, and changing of clothing. Monitor Glenn's behavior and program compliance. Monitor Glenn for suicidal ideation, homicidal ideation, sleep disturbance, and hallucinations. Encourage Glenn to eat all portions of meals and assess for meal preferences. Monitor Glenn for intake and output to ensure hydration. Notify the Physician/Physician Assistant/Advance Practice Registered Nurse (MD/PA/APRN) for any medication refusal and any change in patient condition.      Response:    Glenn provides limited self-disclosure. He appears restless and is aloof. No complaints reported.      Plan:     Continue to monitor per MD/PA/APRN orders; maintain patient safety.

## 2023-09-25 NOTE — NURSING
Pt changed mind states he'll take the meds Vistaril 50mg po given for anxiety will cont to monitor.

## 2023-09-26 VITALS
RESPIRATION RATE: 20 BRPM | SYSTOLIC BLOOD PRESSURE: 125 MMHG | WEIGHT: 221.31 LBS | HEART RATE: 60 BPM | OXYGEN SATURATION: 96 % | BODY MASS INDEX: 29.97 KG/M2 | HEIGHT: 72 IN | TEMPERATURE: 98 F | DIASTOLIC BLOOD PRESSURE: 88 MMHG

## 2023-09-26 PROCEDURE — 25000003 PHARM REV CODE 250: Performed by: PSYCHIATRY & NEUROLOGY

## 2023-09-26 RX ORDER — RISPERIDONE 1 MG/1
1 TABLET ORAL 2 TIMES DAILY
Qty: 60 TABLET | Refills: 0 | Status: SHIPPED | OUTPATIENT
Start: 2023-09-26 | End: 2023-10-26

## 2023-09-26 RX ADMIN — IBUPROFEN 400 MG: 400 TABLET, FILM COATED ORAL at 03:09

## 2023-09-26 RX ADMIN — RISPERIDONE 1 MG: 1 TABLET ORAL at 08:09

## 2023-09-26 NOTE — SUBJECTIVE & OBJECTIVE
Psychiatric Mental Status Exam:  General Appearance: dressed in hospital garb, disheveled, malodorous  Arousal: alert  Behavior: cooperative  Movements and Motor Activity: no abnormal involuntary movements noted; no tics, no tremors, no akathisia, no dystonia, no evidence of tardive dyskinesia; no psychomotor agitation or retardation  Orientation: oriented to person, place, time, and situation  Speech: normal rate, rhythm, volume, tone and pitch  Mood: euthymic  Affect: mood-congruent  Thought Process: focused  Associations: no loosening of associations  Thought Content and Perceptions: no AH/VH, no delusions, no si/hi  Recent and Remote Memory: intact, recent memory intact, remote memory intact; per interview/observation with patient  Attention and Concentration: intact; per interview/observation with patient  Fund of Knowledge: intact, aware of current events, vocabulary appropriate; based on history, vocabulary, fund of knowledge, syntax, grammar, and content  Insight: limited; based on understanding of severity of illness and HPI  Judgment: limited; based on patient's behavior and HPI    Family History    None       Tobacco Use    Smoking status: Not on file    Smokeless tobacco: Not on file   Substance and Sexual Activity    Alcohol use: Not Currently    Drug use: Yes     Types: Marijuana, Methamphetamines    Sexual activity: Not on file     Psychotherapeutics (From admission, onward)      Start     Stop Route Frequency Ordered    09/22/23 1030  risperiDONE tablet 1 mg         -- Oral 2 times daily 09/22/23 1026             Review of Systems  Objective:     Vital Signs (Most Recent):  Temp: 98.3 °F (36.8 °C) (09/26/23 0611)  Pulse: 60 (09/26/23 0611)  Resp: 20 (09/26/23 0611)  BP: 125/88 (09/26/23 0611)  SpO2: 96 % (09/26/23 0611) Vital Signs (24h Range):  Temp:  [97.7 °F (36.5 °C)-98.4 °F (36.9 °C)] 98.3 °F (36.8 °C)  Pulse:  [58-62] 60  Resp:  [18-20] 20  SpO2:  [96 %-97 %] 96 %  BP: (125-145)/(82-88) 125/88      Height: 6' (182.9 cm)  Weight: 100.4 kg (221 lb 5.5 oz)  Body mass index is 30.02 kg/m².    No intake or output data in the 24 hours ending 09/26/23 0901     Physical Exam        Significant Labs: Last 72 Hours:   Recent Lab Results       None            Significant Imaging: I have reviewed all pertinent imaging results/findings within the past 24 hours.

## 2023-09-26 NOTE — NURSING
PRN Medication Follow-up Note:    Behavior:    Patient (Glenn Ruiz is a 53 y.o. male, : 1970, MRN: [id number])     Allergies: Patient has no known allergies.    Citlallis  height is 6' (1.829 m) and weight is 100.4 kg (221 lb 5.5 oz). His oral temperature is 98.4 °F (36.9 °C). His blood pressure is 145/82 (abnormal) and his pulse is 62. His respiration is 18 and oxygen saturation is 97%.     Administered _Vistaril 50 mg PO__ per physician order to Glenn       Response:    Glenn's response: _patient lying in bed asleep, no further complaints at this time___      Plan:     Continue to monitor per MD/PA/APRN orders; and reevaluate medication effectiveness within 30 minutes.

## 2023-09-26 NOTE — NURSING
"Patient at Nurses station (2150) saying he thinks he has "fever".  Offered Gatorade.  Vital signs taken, /82, P 58, T 97.7.      At 2200, in his room saying his left foot hurts, and had "bumped it on something", assessed by nurse. Slightly red and swollen, sensitive to touch, skin intact, top of left foot,  middle to medial bridge area.      States he broke his foot 2 months ago and went to [facility] and it was broken.  He was asked by the nurse how he broke it, and his response was "I don't know".  He asked if he can "go to the emergency room to get an Xray".  He ask staff to call the doctor, then asked, to get Ibuprofen but already had it at 2105 for  for complaints of, "both feet and back hurting" , telling medication nurse Zoë Bedoya Lpn.    Offered ice (refused) and wedge to elevate at 2205.  Initially accepted the wedge.   Went back in his room at 2220, he had thrown his wedge of the floor, patient appears to be asleep on his right side facing the window.      Q 15 minute safety checks in progress.  Will continue to monitor.           "

## 2023-09-26 NOTE — DISCHARGE SUMMARY
"[facility]  Psychiatry  Discharge Summary      Patient Name: Glenn Ruiz  MRN: [id number]  Admission Date: 9/21/2023  Hospital Length of Stay: 5 days  Discharge Date and Time:  09/26/2023 9:11 AM  Attending Physician: Eloy Pulido MD   Discharging Provider: Eloy Pulido MD  Primary Care Provider: Mony, Primary Doctor    HPI:   Glenn Ruiz is a 53 y.o. male with PPH of schizophrenia and substance abuse who presents to [facility] with above chief complaint. Pt seen and chart reviewed. Pt is new to me and new to the [facility] system. Per Care Everywhere, pt was in ED at [facility] in August 2023 with psychosis and was transferred to a psychiatric facility from there.     Pt presents for interview appearing somewhat disheveled. He is very guarded/reticent, history is incredibly vague to the point that it is difficult to gather any history. Responds to most questions with "possibly" or "I don't recall." At times his gives conflicting answers, reliability is overall poor. He reports he was dx with schizophrenia "a couple of months ago possibly" and was recently admitted to in psych somewhere in [place], but does not recall the name of the facility. States that he did not take any of the meds they prescribed once he left because "I don't believe in those medicines." He admits to using meth and THC 1-2x per week, possibly minimizing use to some degree. First states sleep has been poor at home, then says he's sleeping "normal." He does admit to h/o AH - "I have heard a few voices before in my past, voices from God." States he has "possibly" been hearing these voices lately and "possibly" some AH from devil/demons as well. When asked to clarify his previous complaint of "old souls" causing him internal pain, he states he can't describe it at this time. Has exhibited paranoid behavior on the unit and RIS has been observed by staff. He denies any current SI/HI/plan/intent and denies h/o suicide " "attempt or violence. He lives with his mother - hopefully she can provide some helpful collateral information. He is agreeable with restarting Risperdal, which per Care Everywhere were prescribed to him around July 2023. Labs reviewed, elevated Cr 1.3 (likely dehydration) and elevated LFTs, UDS+ THC and amph.    Past Psychiatric History:   Previous Psychiatric Hospitalizations: "a couple of times"   Previous Medication Trials: trileptal,   Previous Suicide Attempts: denies   History of Violence: denies   Outpatient psychiatrist: none currently     Substance Abuse History:   Recreational Drugs: methamphetamine and THC 1-2x per week   Use of Alcohol: not really   Tobacco Use: denies   Rehab History: yes, doesn't recall last time or where     Social History:   Developmental: wnl   Education: bachelors in general studies   Special Ed: denies   Employment Status/Info: unemployed currently, has worked as a HS teacher   Marital Status:    Children: 2 sons   Housing Status: with mom  History of phys/sexual abuse: denies   Access to firearm: denies     Family Psychiatric History:   Denies       Hospital Course:   The patient was admitted to the psychiatric unit and monitored for safety. The medications were reconciled. A history and physical was completed by the primary care doctor and medical issues were addressed. The patient was provided with individual and group therapy. On admit, pt was started on Risperdal for psychosis.    9/25/23-He reports today that he had "got caught up with meth; the issue comes and goes". He claims that he has only been using once or twice a week but that he needed to get a handle on it before he could not longer control his usage. "I used to use pain pills but I have been sober from that for years; I know how easy it is to get caught up". He claims that he has stopped using meth a few times before so he doesn't feel that it is a true issue for him. His family has offered to fly him to " "treatment and pay for everything but he is not interested. He stated that he has been "negligent on my medications and I have made a commitment to my mother to comply with medications so I am in agreeance with continuing them. Since starting the Risperdal he reports that the hallucinations have lessened. He is planning on returning to his home in [place] and stated that "if he happens to relapse" he will consider an outpatient program at that time.    9/26/23-Seen for treatment team.Admitted for meth use and psychosis. Compliant with meds here and no side effects. He has a history of impulsive acts when on drugs. He refuses to go to a rehab program and reviewed that he is high risk for relapse. He says he will go to AA/NA. No active hallucinations or delusions. No si/hi. Mood has improved and he has been less irritable and labile. He is still demanding at times. No violence. No withdrawals. Sleeping and eating well. Not acute danger to self or others. Stable to discharge home today and says he can return home with mother. Mother will pick him up.       Goals of Care Treatment Preferences:  Code Status: Full Code      * No surgery found *     Consults:   Consults (From admission, onward)        Status Ordering Provider     Inpatient consult to Hospitalist  Once        Provider:  Iftikhar Cortes MD    Acknowledged SC[facility]AUGHAN        Physical Exam     Significant Labs:   Last 72 Hours:   Recent Lab Results     None          Significant Imaging: I have reviewed all pertinent imaging results/findings within the past 24 hours.    Smoking Cessation:   Does the patient smoke? Yes  Does the patient want a prescription for Smoking Cessation? No  Does the patient want counseling for Smoking Cessation? No         Pending Diagnostic Studies:     None        Final Active Diagnoses:    Diagnosis Date Noted POA    PRINCIPAL PROBLEM:  Schizophrenia [F20.9] 09/22/2023 Yes    Methamphetamine abuse [F15.10] 09/22/2023 Yes    " Cannabis abuse [F12.10] 09/22/2023 Yes    Tobacco abuse [Z72.0] 09/22/2023 Yes      Problems Resolved During this Admission:      Psychiatric  * Schizophrenia  Stable mood, no psychosis, no si/hi, doing well on meds. Stable to discharge today.        Functional Condition: Independent ambulation    Discharged Condition: fair    Disposition: Home or Self Care    Follow Up:    Patient Instructions:   No discharge procedures on file.  Medications:  Reconciled Home Medications:      Medication List      START taking these medications    risperiDONE 1 MG tablet  Commonly known as: RISPERDAL  Take 1 tablet (1 mg total) by mouth 2 (two) times daily.        CONTINUE taking these medications    amLODIPine 10 MG tablet  Commonly known as: NORVASC  Take 10 mg by mouth Daily.     atorvastatin 40 MG tablet  Commonly known as: LIPITOR  Take 40 mg by mouth nightly.     pantoprazole 40 MG tablet  Commonly known as: PROTONIX  Take 40 mg by mouth Daily.          Is patient being discharged on multiple antipsychotics? No         Eloy Pulido MD  Psychiatry  [facility]

## 2023-09-26 NOTE — NURSING
"Awake, alert, ambulating  in the hallway, blunted affect  and anxious mood, no acute distress,  says he is sleeping well at night and is satisfied with medications and no complaints at this time,  paces often, verbally states he is doing "good", denies any SI,HI,AVH at this time.  Q 15 minute safety checks in progress,  Will continue to monitor.   "

## 2023-09-26 NOTE — NURSING
PRN Administration Note:    Behavior:    Patient (Glenn Ruiz is a 53 y.o. male, : 1970, MRN: [id number])     Allergies: Patient has no known allergies.    Glenn's  height is 6' (1.829 m) and weight is 100.4 kg (221 lb 5.5 oz). His oral temperature is 98.4 °F (36.9 °C). His blood pressure is 145/82 (abnormal) and his pulse is 62. His respiration is 18 and oxygen saturation is 97%.     Reason for PRN Administration: _wanted meds to help him sleep____.    Intervention:    Administered _Vistaril 50 mg PO__ per physician order to Glenn       Response:    Glenn tolerated administration well.      Plan:     Continue to monitor per MD/PA/APRN orders; and reevaluate medication effectiveness within 30 minutes.

## 2023-09-26 NOTE — NURSING
PRN Administration Note:    Behavior:    Patient (Glenn Ruiz is a 53 y.o. male, : 1970, MRN: [id number])     Allergies: Patient has no known allergies.    Glenn's  height is 6' (1.829 m) and weight is 100.4 kg (221 lb 5.5 oz). His oral temperature is 98.4 °F (36.9 °C). His blood pressure is 145/82 (abnormal) and his pulse is 62. His respiration is 18 and oxygen saturation is 97%.     Reason for PRN Administration: _complaints of feet and back pain at a level 10/10____.    Intervention:    Administered _Ibuprofen 400 mg PO__ per physician order to Glenn       Response:    Glenn tolerated administration well.      Plan:     Continue to monitor per MD/PA/APRN orders; and reevaluate medication effectiveness within 30 minutes.

## 2023-09-26 NOTE — NURSING
PRN Medication Follow-up Note:    Behavior:    Patient (Glenn Ruiz is a 53 y.o. male, : 1970, MRN: [id number])     Allergies: Patient has no known allergies.    Citlallis  height is 6' (1.829 m) and weight is 100.4 kg (221 lb 5.5 oz). His oral temperature is 98.4 °F (36.9 °C). His blood pressure is 145/82 (abnormal) and his pulse is 62. His respiration is 18 and oxygen saturation is 97%.     Administered _Ibuprofen 400 mg PO___ per physician order to Glenn       Intervention:    Intervention to Glenn's response: _given wedge to elevate feet to alleviate pain____.       Response:    Glenn's response: _patient in room resting, no further complaints at this time____      Plan:     Continue to monitor per MD/PA/APRN orders; and reevaluate medication effectiveness within 30 minutes.

## 2023-09-26 NOTE — NURSING
PRN Administration Note:    Behavior:    Patient (Glenn Ruiz is a 53 y.o. male, : 1970, MRN: [id number])     Allergies: Patient has no known allergies.    Glenn's  height is 6' (1.829 m) and weight is 100.4 kg (221 lb 5.5 oz). His oral temperature is 98.4 °F (36.9 °C). His blood pressure is 145/82 (abnormal) and his pulse is 62. His respiration is 18 and oxygen saturation is 97%.     Reason for PRN Administration: _complaints of feet and back hurting at alevel 9/10____.    Intervention:    Administered _Ibuprofen 400 mg PO__ per physician order to Glenn       Response:    Glenn tolerated administration well.      Plan:     Continue to monitor per MD/PA/APRN orders; and reevaluate medication effectiveness within 30 minutes.

## 2023-09-26 NOTE — NURSING
AAOX3. Slept well. Speech is appropriate. Denies symptoms of detox. Remains somatic and somewhat drug seeking. Denies S/I, A/V/H. D/C today with follow up at [facility]. Q 15 min. Safety checks continue.

## 2023-09-26 NOTE — NURSING
Nursing Group Note  Group Start Time: 1915  Group End Time: 2000  Groups Date: 9/25/23  Group Topic:  Behavorial Health  Group Department: [facility]  Group Facilitators:  Mike Pa RN  _____________________________________________________________________    Patient Name: Glenn Ruiz  MRN: [id number]  Patient Class: IP- Psych   Admission Date\Time: 9/21/2023  8:23 PM  Hospital Length of Stay: 5  Primary Care Provider: Mony, Primary Doctor     Referred by: Behavioral Medicine Unit Treatment Team     Target symptoms: Substance Abuse, Mood Disorder, and Psychosis     Patient's response to treatment: Active Listening     Progress toward goals: Progressing slowly     Interval History: Aloof, guarded, blunted affect, frequent at nurses station     Diagnosis: SAD     Plan: Continue treatment on BMU

## 2023-09-26 NOTE — NURSING
PRN Medication Follow-up Note:    Behavior:    Patient (Glenn Ruiz is a 53 y.o. male, : 1970, MRN: [id number])     Allergies: Patient has no known allergies.    Citlallis  height is 6' (1.829 m) and weight is 100.4 kg (221 lb 5.5 oz). His oral temperature is 98.4 °F (36.9 °C). His blood pressure is 145/82 (abnormal) and his pulse is 62. His respiration is 18 and oxygen saturation is 97%.     Administered _Ibuprofen 400 mg PO__ per physician order to Glenn       Intervention:    Intervention to Glenn's response: _given wedge to elevate feet as well as offered an ice pack____.       Response:    Glenn's response: _wedge on floor, patient lying on his side sleeping facing the window, no further complaints at this time___      Plan:     Continue to monitor per MD/PA/APRN orders; and reevaluate medication effectiveness within 30 minutes.

## 2023-09-26 NOTE — NURSING
Daily Nursing Note:      Behavior:    Patient (Glenn Ruiz is a 53 y.o. male, : 1970, MRN: [id number]) demonstrating an affect that was congruent. Glenn demonstrating mood that is anxious. Glenn had an appearance that was clean. Glenn denies suicidal ideation. Glenn denies suicide plan. Glenn denies homicidal ideation. Glenn denies hallucinations.    Glenn's  height is 6' (1.829 m) and weight is 100.4 kg (221 lb 5.5 oz). His oral temperature is 98.3 °F (36.8 °C). His blood pressure is 125/88 and his pulse is 60. His respiration is 20 and oxygen saturation is 96%.     Citlallis last BM was noted on: 23  _______      Intervention:    Encourage Glenn to perform self-hygiene, grooming, and changing of clothing. Monitor Glenn's behavior and program compliance. Monitor Glenn for suicidal ideation, homicidal ideation, sleep disturbance, and hallucinations. Encourage Glenn to eat all portions of meals and assess for meal preferences. Monitor Glenn for intake and output to ensure hydration. Notify the Physician/Physician Assistant/Advance Practice Registered Nurse (MD/PA/APRN) for any medication refusal and any change in patient condition.      Response:    Glenn verbalizes understand of unit process and procedures. Glenn reported medications ______.      Plan:     Continue to monitor per MD/PA/APRN orders; maintain patient safety.

## 2023-09-26 NOTE — PROGRESS NOTES
"Discharge Note:    Glenn Ruiz is a 53 y.o. male, : 1970, MRN: [id number], admitted on 2023 for Eloy Pulido MD with a diagnosis of Schizophrenia [F20.9].    Patient discharged on 2023 per physician orders in stable condition. Patient denied suicidal ideation, homicidal ideation, or hallucinations. Patient was discharged with valuables, personal belongings, prescriptions, discharge instructions, and an educational handout explaining the diagnosis and prescribed medications. Patient verbalized understanding of the discharge instructions and importance of follow-up visits. Patient was escorted out of the facility by [facility] and placed into a private vehicle , mother to be transported to family home. Cooperative . " I'm gonna stay taking my meds. "     Patient discharged on the following medications:     Medication List        START taking these medications      risperiDONE 1 MG tablet  Commonly known as: RISPERDAL  Take 1 tablet (1 mg total) by mouth 2 (two) times daily.            CONTINUE taking these medications      amLODIPine 10 MG tablet  Commonly known as: NORVASC     atorvastatin 40 MG tablet  Commonly known as: LIPITOR     pantoprazole 40 MG tablet  Commonly known as: PROTONIX               Where to Get Your Medications        These medications were sent to [id number] at [address]  [address] [facility] [address]      Phone: [phone]   risperiDONE 1 MG tablet        "

## 2024-06-27 DIAGNOSIS — K21.9 GERD WITH APNEA: Primary | ICD-10-CM

## 2024-06-27 DIAGNOSIS — R06.81 GERD WITH APNEA: Primary | ICD-10-CM

## 2025-04-07 ENCOUNTER — HOSPITAL ENCOUNTER (OUTPATIENT)
Dept: RADIOLOGY | Facility: HOSPITAL | Age: 55
Discharge: HOME | End: 2025-04-07
Attending: NURSE PRACTITIONER
Payer: MEDICAID

## 2025-04-07 DIAGNOSIS — M25.561 RIGHT KNEE PAIN: ICD-10-CM

## 2025-04-07 DIAGNOSIS — M25.571 RIGHT ANKLE PAIN: ICD-10-CM

## 2025-04-07 DIAGNOSIS — M54.50 LUMBAGO: ICD-10-CM

## 2025-04-07 PROCEDURE — 73600 X-RAY EXAM OF ANKLE: CPT | Mod: TC,RT

## 2025-04-07 PROCEDURE — 72100 X-RAY EXAM L-S SPINE 2/3 VWS: CPT | Mod: TC

## 2025-04-07 PROCEDURE — 73562 X-RAY EXAM OF KNEE 3: CPT | Mod: TC,50
